# Patient Record
Sex: FEMALE | Race: WHITE | NOT HISPANIC OR LATINO | Employment: OTHER | URBAN - METROPOLITAN AREA
[De-identification: names, ages, dates, MRNs, and addresses within clinical notes are randomized per-mention and may not be internally consistent; named-entity substitution may affect disease eponyms.]

---

## 2017-05-10 ENCOUNTER — TELEPHONE (OUTPATIENT)
Dept: FAMILY MEDICINE | Facility: CLINIC | Age: 62
End: 2017-05-10

## 2017-05-10 NOTE — TELEPHONE ENCOUNTER
Panel Management Review      Patient has the following on her problem list: None      Composite cancer screening  Chart review shows that this patient is due/due soon for the following Pap Smear  Summary:    Patient is due/failing the following:   PAP    Action needed:   Patient needs office visit for pap only appointment, had physical in December 2016 but no pap was done. Does she get this done somewhere else?    Type of outreach:    Phone, spoke to patient.  she knows she is due and will call ob gyn to schedule    Questions for provider review:    None                                                                                                                                    Karlee Wylie CMA       Chart routed to Care Team .

## 2017-06-03 ENCOUNTER — HEALTH MAINTENANCE LETTER (OUTPATIENT)
Age: 62
End: 2017-06-03

## 2017-09-12 ENCOUNTER — OFFICE VISIT (OUTPATIENT)
Dept: VASCULAR SURGERY | Facility: CLINIC | Age: 62
End: 2017-09-12
Payer: COMMERCIAL

## 2017-09-12 ENCOUNTER — OFFICE VISIT (OUTPATIENT)
Dept: FAMILY MEDICINE | Facility: CLINIC | Age: 62
End: 2017-09-12
Payer: COMMERCIAL

## 2017-09-12 VITALS
DIASTOLIC BLOOD PRESSURE: 76 MMHG | SYSTOLIC BLOOD PRESSURE: 114 MMHG | RESPIRATION RATE: 12 BRPM | WEIGHT: 164 LBS | HEART RATE: 50 BPM | TEMPERATURE: 97.5 F | BODY MASS INDEX: 26.47 KG/M2 | OXYGEN SATURATION: 99 %

## 2017-09-12 DIAGNOSIS — Z11.59 ENCOUNTER FOR HEPATITIS C SCREENING TEST FOR LOW RISK PATIENT: ICD-10-CM

## 2017-09-12 DIAGNOSIS — Z53.9 ERRONEOUS ENCOUNTER--DISREGARD: Primary | ICD-10-CM

## 2017-09-12 DIAGNOSIS — Z23 NEED FOR PROPHYLACTIC VACCINATION AND INOCULATION AGAINST INFLUENZA: ICD-10-CM

## 2017-09-12 DIAGNOSIS — Z13.6 CARDIOVASCULAR SCREENING; LDL GOAL LESS THAN 160: ICD-10-CM

## 2017-09-12 DIAGNOSIS — S46.812A TRAPEZIUS MUSCLE STRAIN, LEFT, INITIAL ENCOUNTER: Primary | ICD-10-CM

## 2017-09-12 DIAGNOSIS — Z23 NEED FOR SHINGLES VACCINE: ICD-10-CM

## 2017-09-12 LAB
ALBUMIN SERPL-MCNC: 3.9 G/DL (ref 3.4–5)
ALP SERPL-CCNC: 58 U/L (ref 40–150)
ALT SERPL W P-5'-P-CCNC: 26 U/L (ref 0–50)
ANION GAP SERPL CALCULATED.3IONS-SCNC: 7 MMOL/L (ref 3–14)
AST SERPL W P-5'-P-CCNC: 20 U/L (ref 0–45)
BILIRUB SERPL-MCNC: 0.4 MG/DL (ref 0.2–1.3)
BUN SERPL-MCNC: 18 MG/DL (ref 7–30)
CALCIUM SERPL-MCNC: 9.4 MG/DL (ref 8.5–10.1)
CHLORIDE SERPL-SCNC: 105 MMOL/L (ref 94–109)
CHOLEST SERPL-MCNC: 187 MG/DL
CO2 SERPL-SCNC: 28 MMOL/L (ref 20–32)
CREAT SERPL-MCNC: 0.89 MG/DL (ref 0.52–1.04)
GFR SERPL CREATININE-BSD FRML MDRD: 64 ML/MIN/1.7M2
GLUCOSE SERPL-MCNC: 99 MG/DL (ref 70–99)
HDLC SERPL-MCNC: 76 MG/DL
LDLC SERPL CALC-MCNC: 101 MG/DL
NONHDLC SERPL-MCNC: 111 MG/DL
POTASSIUM SERPL-SCNC: 4.3 MMOL/L (ref 3.4–5.3)
PROT SERPL-MCNC: 7.1 G/DL (ref 6.8–8.8)
SODIUM SERPL-SCNC: 140 MMOL/L (ref 133–144)
TRIGL SERPL-MCNC: 48 MG/DL

## 2017-09-12 PROCEDURE — 99243 OFF/OP CNSLTJ NEW/EST LOW 30: CPT | Performed by: SURGERY

## 2017-09-12 PROCEDURE — 99213 OFFICE O/P EST LOW 20 MIN: CPT | Mod: 25 | Performed by: NURSE PRACTITIONER

## 2017-09-12 PROCEDURE — 90471 IMMUNIZATION ADMIN: CPT | Performed by: NURSE PRACTITIONER

## 2017-09-12 PROCEDURE — 90736 HZV VACCINE LIVE SUBQ: CPT | Performed by: NURSE PRACTITIONER

## 2017-09-12 PROCEDURE — 90686 IIV4 VACC NO PRSV 0.5 ML IM: CPT | Performed by: NURSE PRACTITIONER

## 2017-09-12 PROCEDURE — 36415 COLL VENOUS BLD VENIPUNCTURE: CPT | Performed by: NURSE PRACTITIONER

## 2017-09-12 PROCEDURE — 90472 IMMUNIZATION ADMIN EACH ADD: CPT | Performed by: NURSE PRACTITIONER

## 2017-09-12 PROCEDURE — 80061 LIPID PANEL: CPT | Performed by: NURSE PRACTITIONER

## 2017-09-12 PROCEDURE — 80053 COMPREHEN METABOLIC PANEL: CPT | Performed by: NURSE PRACTITIONER

## 2017-09-12 PROCEDURE — 86803 HEPATITIS C AB TEST: CPT | Performed by: NURSE PRACTITIONER

## 2017-09-12 NOTE — PROGRESS NOTES
[facility] Consultation    Airam Medrano is a 62-year-old female who presents with recurrent bilateral lower extremity varicose veins and leg pain.  She has had multiple procedures on both her lower extremities including apparent stripping and multiple procedures for phlebectomies.  She also has had ultrasound-guided sclerotherapy for recurrent varicose veins.  She was concerned that after her last session of sclerotherapy she developed hyperpigmentation on the posterior left calf.  She states that her last ultrasound was about one year ago at [facility].  She does not believe significant superficial venous insufficiency was found.    Airam has suffered complications of hemorrhage from veins on her right ankle.  She has worn support hose for years for 8-12 hours a day.    Should she feels that her symptoms are interfering with activities of daily living because of fatigue of her legs after she's been on her feet for any extended period of time.    She describes her pain as an aching, tiredness and heaviness as well as a cramping pain which is worse when she is standing for long periods of time, improves with elevation of the leg, ibuprofen and walking.  She sometimes gets relief from compression hose.  The pain is located primarily in her thighs and inner calves.    She has no history of deep vein thrombosis or superficial thrombophlebitis.  She does not have history of significant trauma to her legs.    She also complains of pain in the groins extending into her proximal medial thighs after intercourse.    Past medical history  Medical: Essentially negative  Surgical: Stab phlebectomy 1989, 2012 and 2015    Family history varicose veins in her mother.    Medicines: Multivitamins and aspirin    Allergies: NKDA    Social history: She is a nonsmoker and has maybe one drink of alcohol per month she is a mother of two children.    12 point review of systems was completed and is significant for  factor V Leiden mutation, 50 pound intentional weight loss, easy bruising, sore throat, back and neck pain, finger and toenail problems.    Physical exam  General: Pleasant female in no acute distress.  She is 5 feet 6 inches tall and weighs 160 pounds  HEENT: Normocephalic, atraumatic.  EOMI.  External ears and nose are normal.  Respiratory: Normal respiratory effort  Cardiovascular: Pulse is regular  Psychiatric: Judgment, insight, mood and affect are normal  Musculoskeletal: Gait and station are normal.  The joints of her fingers and toes appear normal.  There is no cyanosis  Neurologic: Grossly normal  Extremities: She has scattered  6-8 mm varicosities over her thighs, legs and extending onto her ankles.  She has a varicosity extending down the lateral right ankle onto the lateral aspect of her foot.  There is a 6 mm varicosity coursing from near the left groin crease onto the left medial thigh, down the left medial calf.  There is lipodermatosclerosis with hyperpigmentation around the distal medial left ankle.  She has mild lipodermatosclerosis of her right distal medial leg.  She has 1+ edema of both ankles.  There are numerous telangiectasias about her medial and lateral ankles bilaterally.     Impression  CEAP 4 chronic venous insufficiency bilateral lower extremities.  She states that she does not have superficial venous insufficiency a stone a recent ultrasound.  I will request and review the ultrasound to ensure this is the case.  We will then decide on a course of treatment for her.  This would likely involve a combination of medically necessary phlebectomies and ultrasound-guided sclerotherapy.  We also would need to treat the telangiectasias about her right ankle from which she has bled in the past.    JUAN Rivas M.D.

## 2017-09-12 NOTE — MR AVS SNAPSHOT
After Visit Summary   9/12/2017    Airam Medrano    MRN: [id number]           Patient Information     Date Of Birth          1955        Visit Information        Provider Department      9/12/2017 8:30 AM Haase, Susan Rachele, APRN [facility]        Today's Diagnoses     Trapezius muscle strain, left, initial encounter    -  1    CARDIOVASCULAR SCREENING; LDL GOAL LESS THAN 160           Follow-ups after your visit        Additional Services     [facility] PT, HAND, AND CHIROPRACTIC REFERRAL       **This order will print in the [facility] Scheduling Office**    Physical Therapy, Hand Therapy and Chiropractic Care are available through:    *[facility]  *[facility]  *[facility]    Call one number to schedule at any of the above locations: [phone].    Your provider has referred you to: Physical Therapy at [facility] or [facility]    Indication/Reason for Referral: trapezius muscle strain  Onset of Illness: 1 year ago  Therapy Orders: Evaluate and Treat  Special Programs: none  Special Request: none    Nelly Villegas      Additional Comments for the Therapist or Chiropractor:     Please be aware that coverage of these services is subject to the terms and limitations of your health insurance plan.  Call member services at your health plan with any benefit or coverage questions.      Please bring the following to your appointment:    *Your personal calendar for scheduling future appointments  *Comfortable clothing                  Follow-up notes from your care team     Return in about 3 months (around 12/12/2017) for Physical Exam.      Your next 10 appointments already scheduled     Sep 12, 2017  3:00 PM CDT   CONSULT with Jayme Rivas MD   [facility]    [address], [address]  [facility] [address] [phone]              Who to contact     If you have  "questions or need follow up information about today's clinic visit or your schedule please contact [facility] directly at [phone].  Normal or non-critical lab and imaging results will be communicated to you by MyChart, letter or phone within 4 business days after the clinic has received the results. If you do not hear from us within 7 days, please contact the clinic through [employer] or phone. If you have a critical or abnormal lab result, we will notify you by phone as soon as possible.  Submit refill requests through [employer] or call your pharmacy and they will forward the refill request to us. Please allow 3 business days for your refill to be completed.          Additional Information About Your Visit        [employer] Information     [employer] lets you send messages to your doctor, view your test results, renew your prescriptions, schedule appointments and more. To sign up, go to [email or web address] . Click on \"Log in\" on the left side of the screen, which will take you to the Welcome page. Then click on \"Sign up Now\" on the right side of the page.     You will be asked to enter the access code listed below, as well as some personal information. Please follow the directions to create your username and password.     Your access code is: [email or web address]  Expires: 2017  9:19 AM     Your access code will  in 90 days. If you need help or a new code, please call your [place] clinic or [phone].        Care EveryWhere ID     This is your Care EveryWhere ID. This could be used by other organizations to access your [place] medical records  [id number]        Your Vitals Were     Pulse Temperature Respirations Pulse Oximetry BMI (Body Mass Index)       50 97.5  F (36.4  C) (Oral) 12 99% 26.47 kg/m2        Blood Pressure from Last 3 Encounters:   17 114/76   16 120/60   16 120/72    Weight from Last 3 Encounters:   17 164 lb (74.4 kg)   16 210 lb (95.3 kg) "   01/06/16 185 lb (83.9 kg)              We Performed the Following     Comprehensive metabolic panel     [facility] PT, HAND, AND CHIROPRACTIC REFERRAL     Lipid panel reflex to direct LDL        Primary Care Provider Office Phone # Fax #    Susan Rachele Haase APRIVANA [phone] [address] [facility] [address]        Equal Access to Services     [facility] : Hadii aad ku hadasho Soomaali, waaxda luqadaha, qaybta kaalmada adeegyada, waxay idiin hayaan adeeg kharash la'aan ah. So [facility] [phone].    ATENCIÓN: Si habla espnalini, tiene a aguirre disposición servicios gratuitos de asistencia lingüística. Llame al [phone].    We comply with applicable federal civil rights laws and [place] laws. We do not discriminate on the basis of race, color, national origin, age, disability sex, sexual orientation or gender identity.            Thank you!     Thank you for choosing [facility]  for your care. Our goal is always to provide you with excellent care. Hearing back from our patients is one way we can continue to improve our services. Please take a few minutes to complete the written survey that you may receive in the mail after your visit with us. Thank you!             Your Updated Medication List - Protect others around you: Learn how to safely use, store and throw away your medicines at www.disposemymeds.org.          This list is accurate as of: 9/12/17  9:19 AM.  Always use your most recent med list.                   Brand Name Dispense Instructions for use Diagnosis    aspirin 81 MG tablet     100    ONE DAILY    Congenital deficiency of other clotting factors       MOTRIN 600 MG tablet   Generic drug:  ibuprofen      Take 600 mg by mouth every 6 hours as needed. 2164-6728 mg daily

## 2017-09-12 NOTE — NURSING NOTE
"Chief Complaint   Patient presents with     Musculoskeletal Problem       Initial /76 (BP Location: Right arm, Patient Position: Chair, Cuff Size: Adult Regular)  Pulse 50  Temp 97.5  F (36.4  C) (Oral)  Resp 12  Wt 164 lb (74.4 kg)  SpO2 99%  BMI 26.47 kg/m2 Estimated body mass index is 26.47 kg/(m^2) as calculated from the following:    Height as of 12/12/16: 5' 6\" (1.676 m).    Weight as of this encounter: 164 lb (74.4 kg).  Medication Reconciliation: complete   Karlee Wylie CMA      "

## 2017-09-12 NOTE — Clinical Note
Please abstract the following data from this visit with this patient into the appropriate field in Epic:  Pap smear done on this date: 12/1/2016 (approximately), by this group: [facility], results were normal.

## 2017-09-12 NOTE — MR AVS SNAPSHOT
After Visit Summary   9/12/2017    Airam Medrano    MRN: [id number]           Patient Information     Date Of Birth          1955        Visit Information        Provider Department      9/12/2017 3:00 PM Jayme Rivas MD [facility]      Today's Diagnoses     ERRONEOUS ENCOUNTER--DISREGARD    -  1       Follow-ups after your visit        Your next 10 appointments already scheduled     Apr 24, 2018 11:15 AM CDT   Ultrasound Guided Sclerotherapy with Jayme Rivas MD,  VEIN PROCEDURE ROOM 2   [facility]    [address]  [facility] [address]   [phone]            May 15, 2018  1:15 PM CDT   Assessment Injection with Possible Treatment with Jayme Rivas MD   [facility]    [address]  [facility] [id number] [phone]              Who to contact     If you have questions or need follow up information about today's clinic visit or your schedule please contact [facility] directly at [phone].  Normal or non-critical lab and imaging results will be communicated to you by MyChart, letter or phone within 4 business days after the clinic has received the results. If you do not hear from us within 7 days, please contact the clinic through MyChart or phone. If you have a critical or abnormal lab result, we will notify you by phone as soon as possible.  Submit refill requests through [employer] or call your pharmacy and they will forward the refill request to us. Please allow 3 business days for your refill to be completed.          Additional Information About Your Visit        [employer] Information     [employer] lets you send messages to your doctor, view your test results, renew your prescriptions, schedule appointments and  "more. To sign up, go to [email or web address] . Click on \"Log in\" on the left side of the screen, which will take you to the Welcome page. Then click on \"Sign up Now\" on the right side of the page.     You will be asked to enter the access code listed below, as well as some personal information. Please follow the directions to create your username and password.     Your access code is: [email or web address]  Expires: 2018  1:20 PM     Your access code will  in 90 days. If you need help or a new code, please call your [place] clinic or [phone].        Care EveryWhere ID     This is your Care EveryWhere ID. This could be used by other organizations to access your [place] medical records  [id number]         Blood Pressure from Last 3 Encounters:   17 114/76   16 120/60   16 120/72    Weight from Last 3 Encounters:   17 74.4 kg (164 lb)   16 95.3 kg (210 lb)   16 83.9 kg (185 lb)              Today, you had the following     No orders found for display       Primary Care Provider Office Phone # Fax #    Susan Rachele Haase, APRN [phone] [address] [facility] [address]        Equal Access to Services     [facility]: Hadii eyad bocanegrao Soprasad, waaxda luqadaha, qaybta kaalmada adejaironyada, leda ley . So [facility] [phone].    ATENCIÓN: Si habla español, tiene a aguirre disposición servicios gratuitos de asistencia lingüística. Llame al [phone].    We comply with applicable federal civil rights laws and [place] laws. We do not discriminate on the basis of race, color, national origin, age, disability, sex, sexual orientation, or gender identity.            Thank you!     Thank you for choosing [facility]  for your care. Our goal is always to provide you with excellent care. Hearing back from our patients is one way we can continue to improve our services. Please take a few minutes " to complete the written survey that you may receive in the mail after your visit with us. Thank you!             Your Updated Medication List - Protect others around you: Learn how to safely use, store and throw away your medicines at www.disposemymeds.org.          This list is accurate as of 9/12/17 11:59 PM.  Always use your most recent med list.                   Brand Name Dispense Instructions for use Diagnosis    aspirin 81 MG tablet     100    ONE DAILY    Congenital deficiency of other clotting factors       MOTRIN 600 MG tablet   Generic drug:  ibuprofen      Take 600 mg by mouth every 6 hours as needed. 2486-7680 mg daily

## 2017-09-12 NOTE — LETTER
September 14, 2017      Airam EDOUARD [place]  [address] [address] [facility] [address]         Hi Airam,     Your lab results are as below:   1)  Hepatitis C screening was negative.   2)  Cholesterol was normal at 187,  your LDL (bad cholesterol) and your HDL (good cholesterol) were also within normal range.   3)  Glucose was normal at 99 (normal fasting is <100).     Resulted Orders   Lipid panel reflex to direct LDL   Result Value Ref Range    Cholesterol 187 <200 mg/dL    Triglycerides 48 <150 mg/dL      Comment:      Fasting specimen    HDL Cholesterol 76 >49 mg/dL    LDL Cholesterol Calculated 101 (H) <100 mg/dL      Comment:      Above desirable:  100-129 mg/dl  Borderline High:  130-159 mg/dL  High:             160-189 mg/dL  Very high:       >189 mg/dl      Non HDL Cholesterol 111 <130 mg/dL   Comprehensive metabolic panel   Result Value Ref Range    Sodium 140 133 - 144 mmol/L    Potassium 4.3 3.4 - 5.3 mmol/L    Chloride 105 94 - 109 mmol/L    Carbon Dioxide 28 20 - 32 mmol/L    Anion Gap 7 3 - 14 mmol/L    Glucose 99 70 - 99 mg/dL      Comment:      Fasting specimen    Urea Nitrogen 18 7 - 30 mg/dL    Creatinine 0.89 0.52 - 1.04 mg/dL    GFR Estimate 64 >60 mL/min/1.7m2      Comment:      Non  GFR Calc    GFR Estimate If Black 78 >60 mL/min/1.7m2      Comment:       GFR Calc    Calcium 9.4 8.5 - 10.1 mg/dL    Bilirubin Total 0.4 0.2 - 1.3 mg/dL    Albumin 3.9 3.4 - 5.0 g/dL    Protein Total 7.1 6.8 - 8.8 g/dL    Alkaline Phosphatase 58 40 - 150 U/L    ALT 26 0 - 50 U/L    AST 20 0 - 45 U/L   Hepatitis C Screen Reflex to HCV RNA Quant and Genotype   Result Value Ref Range    Hepatitis C Antibody Nonreactive NR^Nonreactive      Comment:      Assay performance characteristics have not been established for newborns,   infants, and children       If you have any questions or concerns, please call the clinic at the number listed above.     Sincerely,        Susan Haase, APRN  CNP

## 2017-09-12 NOTE — PROGRESS NOTES
SUBJECTIVE:   Airam Medrano is a 62 year old female who presents to clinic today for the following health issues:    Neck Pain      Duration: 1 year    Description:  Location: L side  Radiation:into the left shoulder and into the left forearm    Intensity:  moderate    Accompanying signs and symptoms: occasional numbness in fingers    History (similar episodes/previous evaluation): None    Precipitating or alleviating factors: None    Therapies tried and outcome: changed pillow and purse    Airam has been getting neck pain in her left trapezius. She notices that wearing a seat belt and carrying her cross body bag worsens the pain, and for a while she was having numbness in her 3rd and 4th fingers of her left hand. Since stopping wearing her cross body bag the numbness in her fingers has resolved.   She has tried ice and heat with moderate relief, and denies a past neck injury. She does not want medication, and is instead looking for a referral to physical therapy or massage therapy.    She also noted that her father had torticollis.    OB/GYN: In the past, Airam has gotten her Pap from her OB/GYN. She wants to do it here from now on. She will come back in December to get that done.     Weight:  Has lost 40 lbs since 12/2017, would like her cholesterol checked again.     Problem list and histories reviewed & adjusted, as indicated.  Additional history: as documented    Reviewed and updated as needed this visit by clinical staffTobacco  Allergies  Med Hx  Surg Hx  Fam Hx  Soc Hx      Reviewed and updated as needed this visit by Provider       ROS:  Constitutional, HEENT, cardiovascular, pulmonary, GI, , musculoskeletal, neuro, skin, endocrine and psych systems are negative, except as otherwise noted.    This document serves as a record of the services and decisions personally performed and made by Susan Haase, CNP. It was created on her behalf by George Kiran, a trained medical scribe. The creation of this  document is based the provider's statements to the medical scribe.  George Kiran September 12, 2017 9:23 AM      OBJECTIVE:   /76 (BP Location: Right arm, Patient Position: Chair, Cuff Size: Adult Regular)  Pulse 50  Temp 97.5  F (36.4  C) (Oral)  Resp 12  Wt 74.4 kg (164 lb)  SpO2 99%  BMI 26.47 kg/m2  Body mass index is 26.47 kg/(m^2).  GENERAL: healthy, alert and no distress  NECK: no adenopathy, no asymmetry, masses, or scars and thyroid normal to palpation  RESP: lungs clear to auscultation - no rales, rhonchi or wheezes  CV: regular rate and rhythm, normal S1 S2, no S3 or S4, no murmur, click or rub, no peripheral edema  MS: left posterior trapezius muscle tender to palpation, increased pain with lateral rotation of her head to the left, full flexion, hyperextension.   muscle strength normal  NEURO: BUE with normal strength, tone and reflexes.  CMS intact.   PSYCH: mentation appears normal, affect normal/bright      ASSESSMENT/PLAN:     Airam was seen today for musculoskeletal problem.    Diagnoses and all orders for this visit:    Trapezius muscle strain, left, initial encounter:  Instructed to apply heat or ice for 15-20 min tid, may take ibuprofen or tylenol on prn basis for more severe pain.    -     [facility] PT, HAND, AND CHIROPRACTIC REFERRAL    CARDIOVASCULAR SCREENING; LDL GOAL LESS THAN 160  -     Lipid panel reflex to direct LDL  -     Comprehensive metabolic panel    Need for prophylactic vaccination and inoculation against influenza  -     FLU VAC, SPLIT VIRUS IM > 3 YO (QUADRIVALENT) [47799]  -     Vaccine Administration, Initial [81295]    Need for shingles vaccine  -     ZOSTER VACC LIVE SUBQ NJX    Follow up in 12/2017 for physical exam, sooner as needed.   The information in this document, created by the medical scribe for me, accurately reflects the services I personally performed and the decisions made by me. I have reviewed and approved this document for accuracy.   Susan Haase, CNP      Susan Haase, APRN [facility]  Injectable Influenza Immunization Documentation    1.  Are you sick today? (Fever of 100.5 or higher on the day of the clinic)   No    2.  Have you ever had Guillain-Chelsea Syndrome within 6 weeks of an influenza vaccination?  No    3. Do you have a life-threatening allergy to eggs?  No    4. Do you have a life-threatening allergy to a component of the vaccine? May include antibiotics, gelatin or latex.  No     5. Have you ever had a reaction to a dose of flu vaccine that needed immediate medical attention?  No     Form completed by Lou

## 2017-09-13 LAB — HCV AB SERPL QL IA: NONREACTIVE

## 2017-09-19 ENCOUNTER — THERAPY VISIT (OUTPATIENT)
Dept: PHYSICAL THERAPY | Facility: CLINIC | Age: 62
End: 2017-09-19
Payer: COMMERCIAL

## 2017-09-19 DIAGNOSIS — M54.2 NECK PAIN: Primary | ICD-10-CM

## 2017-09-19 PROCEDURE — 97110 THERAPEUTIC EXERCISES: CPT | Mod: GP | Performed by: PHYSICAL THERAPIST

## 2017-09-19 PROCEDURE — 97161 PT EVAL LOW COMPLEX 20 MIN: CPT | Mod: GP | Performed by: PHYSICAL THERAPIST

## 2017-09-19 NOTE — MR AVS SNAPSHOT
"              After Visit Summary   9/19/2017    Airam Medrano    MRN: [id number]           Patient Information     Date Of Birth          1955        Visit Information        Provider Department      9/19/2017 10:50 AM Ye Morataya, PT [facility]        Today's Diagnoses     Neck pain    -  1       Follow-ups after your visit        Your next 10 appointments already scheduled     Oct 03, 2017  9:40 AM CDT   JANNETTE Spine with Ye Morataya PT   [facility] ([facility])    [address]  [facility] [id number] [phone]            Oct 17, 2017  9:40 AM CDT   JANNETTE Spine with Ye Morataya PT   [facility] ([facility])    [address]  [facility] [id number] [phone]              Who to contact     If you have questions or need follow up information about today's clinic visit or your schedule please contact [facility] directly at [phone].  Normal or non-critical lab and imaging results will be communicated to you by [employer]hart, letter or phone within 4 business days after the clinic has received the results. If you do not hear from us within 7 days, please contact the clinic through [employer] or phone. If you have a critical or abnormal lab result, we will notify you by phone as soon as possible.  Submit refill requests through [employer] or call your pharmacy and they will forward the refill request to us. Please allow 3 business days for your refill to be completed.          Additional Information About Your Visit        [employer] Information     [employer] lets you send messages to your doctor, view your test results, renew your prescriptions, schedule appointments and more. To sign up, go to [email or web address] . Click on \"Log in\" on the left side " "of the screen, which will take you to the Welcome page. Then click on \"Sign up Now\" on the right side of the page.     You will be asked to enter the access code listed below, as well as some personal information. Please follow the directions to create your username and password.     Your access code is: [email or web address]  Expires: 2017  9:19 AM     Your access code will  in 90 days. If you need help or a new code, please call your [facility] clinic or [phone].        Care EveryWhere ID     This is your Care EveryWhere ID. This could be used by other organizations to access your [facility] medical records  [id number]         Blood Pressure from Last 3 Encounters:   17 114/76   16 120/60   16 120/72    Weight from Last 3 Encounters:   17 74.4 kg (164 lb)   16 95.3 kg (210 lb)   16 83.9 kg (185 lb)              We Performed the Following     HC PT EVAL, LOW COMPLEXITY     JANNETTE INITIAL EVAL REPORT     THERAPEUTIC EXERCISES        Primary Care Provider Office Phone # Fax #    Nina Worley Haase, APRN [phone] [address] [facility] [address]        Equal Access to Services     SHANNAN TONG : Hadii aad ku hadasho Soomaali, waaxda luqadaha, qaybta kaalmada adeegyada, waxay idiin hayaan jon ley . So [facility] [phone].    ATENCIÓN: Si habla español, tiene a aguirre disposición servicios gratuitos de asistencia lingüística. Llame al [phone].    We comply with applicable federal civil rights laws and [place] laws. We do not discriminate on the basis of race, color, national origin, age, disability sex, sexual orientation or gender identity.            Thank you!     Thank you for choosing [facility]  for your care. Our goal is always to provide you with excellent care. Hearing back from our patients is one way we can continue to improve our services. Please take a few minutes to " complete the written survey that you may receive in the mail after your visit with us. Thank you!             Your Updated Medication List - Protect others around you: Learn how to safely use, store and throw away your medicines at www.disposemymeds.org.          This list is accurate as of: 9/19/17 11:53 AM.  Always use your most recent med list.                   Brand Name Dispense Instructions for use Diagnosis    aspirin 81 MG tablet     100    ONE DAILY    Congenital deficiency of other clotting factors       MOTRIN 600 MG tablet   Generic drug:  ibuprofen      Take 600 mg by mouth every 6 hours as needed. 5409-8956 mg daily

## 2017-09-19 NOTE — PROGRESS NOTES
Subjective:    Patient is a 62 year old female presenting with rehab cervical spine hpi. The history is provided by the patient. No  was used.   Airam Medrano is a 62 year old female with a cervical spine condition.  Condition occurred with:  Insidious onset.  Condition occurred: for unknown reasons.  This is a chronic condition  Pt reports having left neck pain that has been present for greater than one year.  Onset of sx's occurred when she would war a shoulder purse for extended periods of time and wearing a seat belt.  MD appt 9/14/17..    Patient reports pain:  Cervical left side.  Radiates to:  None.  Pain is described as aching and is intermittent and reported as 2/10.  Associated symptoms:  Loss of motion/stiffness. Pain is worse during the day.  Symptoms are exacerbated by rotating head, looking up or down and driving and relieved by rest.  Since onset symptoms are gradually improving.        General health as reported by patient is excellent.  Pertinent medical history includes:  Overweight.  Medical allergies: no.    Current medications:  None as reported by the patient.  Current occupation is Retired [email or web address]    .        Barriers include:  None as reported by the patient.    Red flags:  None as reported by the patient.                        Objective:    Standing Alignment:    Cervical/Thoracic:  Forward head  Shoulder/UE:  Rounded shoulders                  Flexibility/Screens:   Positive screens:  Cervical                        Cervical/Thoracic Evaluation    AROM:  AROM Cervical:    Flexion:            100% with ERT  Extension:       100% with ERT  Rotation:         Left: 75% with ERT     Right: 75% with ERT  Side Bend:      Left: 75% with ERT     Right:  75% with ERT      Headaches: none                                                            General     ROS    Assessment/Plan:      Patient is a 62 year old female with cervical complaints.    Patient has the following  significant findings with corresponding treatment plan.                Diagnosis 1:  Neck pain  Pain -  self management, education, directional preference exercise and home program  Decreased ROM/flexibility - manual therapy and therapeutic exercise  Decreased strength - therapeutic exercise and therapeutic activities  Impaired muscle performance - neuro re-education  Decreased function - therapeutic activities    Therapy Evaluation Codes:   1) History comprised of:   Personal factors that impact the plan of care:      None.    Comorbidity factors that impact the plan of care are:      Overweight.     Medications impacting care: None.  2) Examination of Body Systems comprised of:   Body structures and functions that impact the plan of care:      Cervical spine.   Activity limitations that impact the plan of care are:      Driving and Sitting.  3) Clinical presentation characteristics are:   Stable/Uncomplicated.  4) Decision-Making    Low complexity using standardized patient assessment instrument and/or measureable assessment of functional outcome.  Cumulative Therapy Evaluation is: Low complexity.    Previous and current functional limitations:  (See Goal Flow Sheet for this information)    Short term and Long term goals: (See Goal Flow Sheet for this information)     Communication ability:  Patient appears to be able to clearly communicate and understand verbal and written communication and follow directions correctly.  Treatment Explanation - The following has been discussed with the patient:   RX ordered/plan of care  Anticipated outcomes  Possible risks and side effects  This patient would benefit from PT intervention to resume normal activities.   Rehab potential is good.    Frequency:  1 X week, once daily  Duration:  for 6 weeks  Discharge Plan:  Achieve all LTG.  Independent in home treatment program.  Reach maximal therapeutic benefit.    Please refer to the daily flowsheet for treatment today, total  treatment time and time spent performing 1:1 timed codes.

## 2017-10-16 PROBLEM — M54.2 NECK PAIN: Status: RESOLVED | Noted: 2017-09-19 | Resolved: 2017-10-16

## 2017-11-01 ENCOUNTER — OFFICE VISIT (OUTPATIENT)
Dept: VASCULAR SURGERY | Facility: CLINIC | Age: 62
End: 2017-11-01
Payer: COMMERCIAL

## 2017-11-01 ENCOUNTER — APPOINTMENT (OUTPATIENT)
Dept: VASCULAR SURGERY | Facility: CLINIC | Age: 62
End: 2017-11-01
Payer: COMMERCIAL

## 2017-11-01 DIAGNOSIS — Z53.9 ERRONEOUS ENCOUNTER--DISREGARD: Primary | ICD-10-CM

## 2017-11-01 PROCEDURE — 93970 EXTREMITY STUDY: CPT | Performed by: SURGERY

## 2017-11-01 PROCEDURE — 99213 OFFICE O/P EST LOW 20 MIN: CPT | Performed by: SURGERY

## 2017-11-01 NOTE — MR AVS SNAPSHOT
After Visit Summary   11/1/2017    Airam Medrano    MRN: [id number]           Patient Information     Date Of Birth          1955        Visit Information        Provider Department      11/1/2017 10:30 AM Jayme Rivas MD [facility]      Today's Diagnoses     ERRONEOUS ENCOUNTER--DISREGARD    -  1       Follow-ups after your visit        Your next 10 appointments already scheduled     Apr 24, 2018 11:15 AM CDT   Ultrasound Guided Sclerotherapy with Jayme Rivas MD,  VEIN PROCEDURE ROOM 2   [facility] ([facility])    [address]  [facility] [address]   [phone]            May 15, 2018  1:15 PM CDT   Assessment Injection with Possible Treatment with Jayme Rivas MD   [facility] ([facility])    [address]  [facility] [id number] [phone]              Who to contact     If you have questions or need follow up information about today's clinic visit or your schedule please contact [facility] directly at [phone].  Normal or non-critical lab and imaging results will be communicated to you by MyChart, letter or phone within 4 business days after the clinic has received the results. If you do not hear from us within 7 days, please contact the clinic through MyChart or phone. If you have a critical or abnormal lab result, we will notify you by phone as soon as possible.  Submit refill requests through [employer] or call your pharmacy and they will forward the refill request to us. Please allow 3 business days for your refill to be completed.          Additional Information About Your Visit        [employer] Information     [employer] lets you send messages to your doctor, view your test results, renew your prescriptions, schedule appointments and  "more. To sign up, go to [email or web address] . Click on \"Log in\" on the left side of the screen, which will take you to the Welcome page. Then click on \"Sign up Now\" on the right side of the page.     You will be asked to enter the access code listed below, as well as some personal information. Please follow the directions to create your username and password.     Your access code is: [email or web address]  Expires: 2018  1:20 PM     Your access code will  in 90 days. If you need help or a new code, please call your [facility] clinic or [phone].        Care EveryWhere ID     This is your Care EveryWhere ID. This could be used by other organizations to access your [facility] medical records  [id number]         Blood Pressure from Last 3 Encounters:   17 114/76   16 120/60   16 120/72    Weight from Last 3 Encounters:   17 74.4 kg (164 lb)   16 95.3 kg (210 lb)   16 83.9 kg (185 lb)              Today, you had the following     No orders found for display       Primary Care Provider Office Phone # Fax #    Susan Rachele Haase, APRN [phone] [address] [facility] [address]        Equal Access to Services     [facility]: Hadii eyad bocanegrao Soprasad, waaxda luqadaha, qaybta kaalmada adejaironyada, leda ley . So [facility] [phone].    ATENCIÓN: Si habla español, tiene a aguirre disposición servicios gratuitos de asistencia lingüística. Llame al [phone].    We comply with applicable federal civil rights laws and [place] laws. We do not discriminate on the basis of race, color, national origin, age, disability, sex, sexual orientation, or gender identity.            Thank you!     Thank you for choosing [facility]  for your care. Our goal is always to provide you with excellent care. Hearing back from our patients is one way we can continue to improve our services. Please take a few minutes " to complete the written survey that you may receive in the mail after your visit with us. Thank you!             Your Updated Medication List - Protect others around you: Learn how to safely use, store and throw away your medicines at www.disposemymeds.org.          This list is accurate as of 11/1/17 11:59 PM.  Always use your most recent med list.                   Brand Name Dispense Instructions for use Diagnosis    aspirin 81 MG tablet     100    ONE DAILY    Congenital deficiency of other clotting factors       MOTRIN 600 MG tablet   Generic drug:  ibuprofen      Take 600 mg by mouth every 6 hours as needed. 2461-0780 mg daily

## 2017-11-02 NOTE — PROGRESS NOTES
[facility] Progress Note    Airam Medrano returns in follow-up of bilateral lower family pain, recurrent varicose veins and swelling.  Please see my dictation of 9/12/17 for details.  She returned today for duplex ultrasound of her lower extremity veins bilaterally.    Duplex ultrasound of her right lower extremity veins reveals no evidence of deep vein thrombosis.  Her right common femoral vein is incompetent but the remaining deep vein bowels are competent.  Her right great saphenous vein is surgically absent with only a short segment remaining at the level of the knee which is tortuous and which supplies a 4.4 mm diameter incompetent vein branch.  The right small saphenous vein is competent.  The anterior accessory saphenous vein is not visualized and the right vein of Giacomini is competent.  There is an incompetent vein branch coursing down the anteromedial right thigh measuring 4.1 mm in diameter.  There is a 4 mm diameter incompetent  on the medial aspect of the right calf 17 cm above the right medial malleolus which Indicates with the 4.4 mm diameter incompetent vein branch.    In the left lower extremity was no evidence of deep vein thrombosis.  The left lower extremity deep veins are incompetent from the common femoral through the popliteal veins.  The left great saphenous vein is for the most part absent.  There is a short segment in the thigh which is competent and a short segment at the mid calf which is incompetent.  The left small saphenous vein is competent as is the vein of Giacomini.  The left anterior accessory saphenous vein is not visualized.  There is a 5 mm diameter pelvic source varicose vein coursing from the posterior medial aspect of the thigh coursing down the medial thigh and onto the medial aspect of the right leg where it measures 4.8 mm in diameter.    Exam reveals 2+ edema of her ankles bilaterally with lipomata metal sclerosis but sees firmness and hyperpigmentation  cortices of both of her distal medial legs.  There are sizable varicosities coursing from her thighs down to her legs bilaterally.    Impression  CEAP 4 bilateral lower extremity chronic venous insufficiency.  She has been wearing compression hose for years but continues to progress despite this along with exercise, leg elevation, dietary measures and attempted weight loss.  We discussed options of continued conservative management with the aforementioned conservative measures with risks of progression of her disease process.    If she chose treatment, she would be a candidate for endovenous ablation of the incompetent  on her right leg with phlebectomies of the visible varicose veins.  She would require ultrasound-guided sclerotherapy of the pelvic source varicose veins on her left leg with phlebectomies of visible varicose veins.    She will likely require ultrasound-guided sclerotherapy of recurrent varicose veins on her right thigh and leg if she does not have adequate relief of her symptoms with simple phlebectomy and  ablation.  This was discussed frankly with her.    JUAN Rivas M.D.

## 2017-12-12 ENCOUNTER — OFFICE VISIT (OUTPATIENT)
Dept: VASCULAR SURGERY | Facility: CLINIC | Age: 62
End: 2017-12-12
Payer: COMMERCIAL

## 2017-12-12 DIAGNOSIS — Z53.9 ERRONEOUS ENCOUNTER--DISREGARD: Primary | ICD-10-CM

## 2017-12-12 PROCEDURE — 37766 PHLEB VEINS - EXTREM 20+: CPT | Mod: RT | Performed by: SURGERY

## 2017-12-12 PROCEDURE — 36475 ENDOVENOUS RF 1ST VEIN: CPT | Mod: RT | Performed by: SURGERY

## 2017-12-12 NOTE — PROGRESS NOTES
[facility] Operative Report    Preoperative diagnosis  1.  Bilateral CEAP 4 chronic venous insufficiency  2.  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  Radiofrequency ablation incompetent, symptomatically perforating vein right lower extremity (12 cm from right medial malleolus)  2.  Multiple medically necessary step phlebectomies bilateral lower extremities (22 stabs right, 23 stabs left)    Surgeon  JUAN Rivas M.D.    First assistant  Serena Harris CST/CSFA    Anesthesia  Ativan 3 mg, clonidine 0.1 mg orally with subcutaneous tumescent    Operative description  Details the procedure including risks of bleeding, infection, nerve injury, scarring, hyperpigmentation, the vein thrombosis, recanalization of the close veins and recurrent varicose veins had been discussed.  The patient appeared to understand and wished to proceed.  Informed consent was obtained.    I had the patient stand and marked varicosities coursing from the proximal thighs to the distal legs bilaterally.  We then proceeded to the operating room, had the patient lie supine on the operative table, then prepped and draped both groins and both lower extremities sterilely from the groins to the toes.    We took a timeout to confirm the appropriate operative site and procedure: Radiofrequency ablation of an incompetent perforating vein right medial leg with bilateral, medically necessary phlebectomies.    With the operative table in reverse Trendelenburg position, I interrogated the right leg with ultrasound was able to clearly identify the incompetent perforating vein on the medial aspect of the right leg.  This  actually lay about 12 cm cephalad of the right medial malleolus.  I searched carefully for other incompetent perforators but only found a very small  approximately 18  cm cephalad of the medial malleolus.    I infiltrated the skin overlying the incompetent  with 1% lidocaine, made a stab wound with an 11 scalpel and then accessed the vein with the RFS closure device obtaining blood return and impedance readings of 289 ohms.  1% lidocaine was infiltrated around the  on GUIDANCE AND THE BLOCK ALLOWED TO TAKE EFFECT.  WE PLACED SEPARATE TABLE IN TRENDELENBURG POSITION, THEN TREATED THE  FOR FOUR MINUTES, ONE MINUTE IN THE 12, SIX, THREE AND 9:00 QUADRANTS AT THE LEVEL OF THE FASCIA.  I THEN WITHDREW THE DEVICE APPROXIMATELY 8 MM AND TREATED FOR ANOTHER FOUR MINUTES IN THE VEIN JUST SUPERFICIAL TO THE FASCIA FOR FOUR MINUTES, ONE MINUTE AND 12, SIX, THREE AND 9:00 QUADRANTS.  I REMOVED THE STYLETTE AND REIMAGED THE VEIN FINDING IT TO BE WITHOUT FLOW.    WE MADE STAB WAS DECIDED TO THE MARKED VARICOSITIES, RETRIEVEd THEM WITH EITHER VEIN hooks or JOSE HOOKS AND AVULSED THEM WITH MOSQUITO CLAMPS.  Hemostasis was secured with pressure.    After we completed the phlebectomies, with clean legs with saline solution and placed petroleum jelly on the skin.  We dressed the leg with ABD pads, cast padding and an Ace bandage from the toes to groin.  We then observed for 30 minutes to ensure excellent hemostasis.    Postoperative instructions were covered in written and verbal form with her  and then she was taken to her vehicle in a wheelchair.  She tolerated the procedure well without complications.    We used a total of 272 mL of tumescent anesthetic to perform a 22 phlebectomies on the right leg, 23 phlebectomy on the left leg.  We used a total of seven mL of 1% lidocaine.    JUAN Rivas M.D.

## 2017-12-12 NOTE — MR AVS SNAPSHOT
After Visit Summary   12/12/2017    Airam Medrano    MRN: [id number]           Patient Information     Date Of Birth          1955        Visit Information        Provider Department      12/12/2017 8:30 AM Jayme Rivas MD;  VEIN PROCEDURE ROOM 1 [facility]      Today's Diagnoses     ERRONEOUS ENCOUNTER--DISREGARD    -  1       Follow-ups after your visit        Your next 10 appointments already scheduled     Apr 24, 2018 11:15 AM CDT   Ultrasound Guided Sclerotherapy with Jayme Rivas MD,  VEIN PROCEDURE ROOM 2   [facility] ([facility])    [address]  [facility] [address] [phone]            May 15, 2018  1:15 PM CDT   Assessment Injection with Possible Treatment with Jayme Rivas MD   [facility] ([facility]    [address]  [facility] [address] [phone]              Who to contact     If you have questions or need follow up information about today's clinic visit or your schedule please contact [facility] directly at [phone].  Normal or non-critical lab and imaging results will be communicated to you by MyChart, letter or phone within 4 business days after the clinic has received the results. If you do not hear from us within 7 days, please contact the clinic through MyChart or phone. If you have a critical or abnormal lab result, we will notify you by phone as soon as possible.  Submit refill requests through Metac[employer] or call your pharmacy and they will forward the refill request to us. Please allow 3 business days for your refill to be completed.          Additional Information About Your Visit        Labelby.mehart Information     Met[employer] lets you send messages to your doctor, view your test results, renew your prescriptions,  "schedule appointments and more. To sign up, go to [email or web address] . Click on \"Log in\" on the left side of the screen, which will take you to the Welcome page. Then click on \"Sign up Now\" on the right side of the page.     You will be asked to enter the access code listed below, as well as some personal information. Please follow the directions to create your username and password.     Your access code is: [email or web address]  Expires: 2018  1:20 PM     Your access code will  in 90 days. If you need help or a new code, please call your [place] clinic or [phone].        Care EveryWhere ID     This is your Care EveryWhere ID. This could be used by other organizations to access your [place] medical records  [id number]         Blood Pressure from Last 3 Encounters:   17 114/76   16 120/60   16 120/72    Weight from Last 3 Encounters:   17 74.4 kg (164 lb)   16 95.3 kg (210 lb)   16 83.9 kg (185 lb)              Today, you had the following     No orders found for display       Primary Care Provider Office Phone # Fax #    Nina Rachele Haase, APRN [phone] [address] [facility] [address]        Equal Access to Services     [facility]: Hadii aad ku hadasho Sojamarcusali, waaxda luqadaha, qaybta kaalmada adeegyada, leda ley . So [facility] [phone].    ATENCIÓN: Si habla español, tiene a aguirre disposición servicios gratuitos de asistencia lingüística. Llame al [phone].    We comply with applicable federal civil rights laws and [place] laws. We do not discriminate on the basis of race, color, national origin, age, disability, sex, sexual orientation, or gender identity.            Thank you!     Thank you for choosing [facility]  for your care. Our goal is always to provide you with excellent care. Hearing back from our patients is one way we can continue to improve our services. " Please take a few minutes to complete the written survey that you may receive in the mail after your visit with us. Thank you!             Your Updated Medication List - Protect others around you: Learn how to safely use, store and throw away your medicines at www.disposemymeds.org.          This list is accurate as of 12/12/17 11:59 PM.  Always use your most recent med list.                   Brand Name Dispense Instructions for use Diagnosis    aspirin 81 MG tablet     100    ONE DAILY    Congenital deficiency of other clotting factors       MOTRIN 600 MG tablet   Generic drug:  ibuprofen      Take 600 mg by mouth every 6 hours as needed. 2747-5012 mg daily

## 2017-12-15 ENCOUNTER — APPOINTMENT (OUTPATIENT)
Dept: VASCULAR SURGERY | Facility: CLINIC | Age: 62
End: 2017-12-15
Payer: COMMERCIAL

## 2017-12-15 PROCEDURE — 99207 ZZC VEINSOLUTIONS 48 HOUR: CPT | Performed by: SURGERY

## 2017-12-15 PROCEDURE — 93971 EXTREMITY STUDY: CPT | Performed by: SURGERY

## 2018-01-23 ENCOUNTER — OFFICE VISIT (OUTPATIENT)
Dept: VASCULAR SURGERY | Facility: CLINIC | Age: 63
End: 2018-01-23
Payer: COMMERCIAL

## 2018-01-23 ENCOUNTER — APPOINTMENT (OUTPATIENT)
Dept: VASCULAR SURGERY | Facility: CLINIC | Age: 63
End: 2018-01-23
Payer: COMMERCIAL

## 2018-01-23 DIAGNOSIS — Z53.9 ERRONEOUS ENCOUNTER--DISREGARD: Primary | ICD-10-CM

## 2018-01-23 PROCEDURE — 99207 ZZC VEINSOLUTIONS POST OPERATIVE VISIT: CPT | Performed by: SURGERY

## 2018-01-23 PROCEDURE — 36471 NJX SCLRSNT MLT INCMPTNT VN: CPT | Mod: 50 | Performed by: SURGERY

## 2018-01-23 PROCEDURE — 76942 ECHO GUIDE FOR BIOPSY: CPT | Performed by: SURGERY

## 2018-01-23 NOTE — MR AVS SNAPSHOT
After Visit Summary   1/23/2018    Airam Medrano    MRN: [id number]           Patient Information     Date Of Birth          1955        Visit Information        Provider Department      1/23/2018 1:15 PM Jayme Rivas MD [facility]      Today's Diagnoses     ERRONEOUS ENCOUNTER--DISREGARD    -  1       Follow-ups after your visit        Your next 10 appointments already scheduled     Apr 24, 2018 11:15 AM CDT   Ultrasound Guided Sclerotherapy with Jayme Rivas MD,  VEIN PROCEDURE ROOM 2   [facility]    [address]  [facility] [address]   [phone]            May 15, 2018  1:15 PM CDT   Assessment Injection with Possible Treatment with Jayme Rivas MD   [facility]    [address]  [facility] [address] [phone]              Who to contact     If you have questions or need follow up information about today's clinic visit or your schedule please contact [facility] directly at [phone].  Normal or non-critical lab and imaging results will be communicated to you by MyChart, letter or phone within 4 business days after the clinic has received the results. If you do not hear from us within 7 days, please contact the clinic through MyChart or phone. If you have a critical or abnormal lab result, we will notify you by phone as soon as possible.  Submit refill requests through [employer] or call your pharmacy and they will forward the refill request to us. Please allow 3 business days for your refill to be completed.          Additional Information About Your Visit        [employer] Information     [employer] lets you send messages to your doctor, view your test results, renew your prescriptions, schedule appointments and  "more. To sign up, go to [email or web address] . Click on \"Log in\" on the left side of the screen, which will take you to the Welcome page. Then click on \"Sign up Now\" on the right side of the page.     You will be asked to enter the access code listed below, as well as some personal information. Please follow the directions to create your username and password.     Your access code is: [email or web address]  Expires: 2018  1:20 PM     Your access code will  in 90 days. If you need help or a new code, please call your [place] clinic or [phone].        Care EveryWhere ID     This is your Care EveryWhere ID. This could be used by other organizations to access your [place] medical records  [id number]         Blood Pressure from Last 3 Encounters:   17 114/76   16 120/60   16 120/72    Weight from Last 3 Encounters:   17 74.4 kg (164 lb)   16 95.3 kg (210 lb)   16 83.9 kg (185 lb)              Today, you had the following     No orders found for display       Primary Care Provider Office Phone # Fax #    Susan Rachele Haase, APRN [phone] [address] [facility] [address]        Equal Access to Services     [facility]: Hadii eyad bocanegrao Soprasad, waaxda luqadaha, qaybta kaalmada adejaironyada, leda ley . So [facility] [phone].    ATENCIÓN: Si habla español, tiene a aguirre disposición servicios gratuitos de asistencia lingüística. Llame al [phone].    We comply with applicable federal civil rights laws and [place] laws. We do not discriminate on the basis of race, color, national origin, age, disability, sex, sexual orientation, or gender identity.            Thank you!     Thank you for choosing [facility]  for your care. Our goal is always to provide you with excellent care. Hearing back from our patients is one way we can continue to improve our services. Please take a few minutes " to complete the written survey that you may receive in the mail after your visit with us. Thank you!             Your Updated Medication List - Protect others around you: Learn how to safely use, store and throw away your medicines at www.disposemymeds.org.          This list is accurate as of 1/23/18 11:59 PM.  Always use your most recent med list.                   Brand Name Dispense Instructions for use Diagnosis    aspirin 81 MG tablet     100    ONE DAILY    Congenital deficiency of other clotting factors       MOTRIN 600 MG tablet   Generic drug:  ibuprofen      Take 600 mg by mouth every 6 hours as needed. 7285-7213 mg daily

## 2018-01-23 NOTE — PROGRESS NOTES
[facility] Procedure Report    Preprocedure diagnosis  1. Bilateral leg pain and varicose veins; status post endovenous ablation symptomatic perforating vein right lower extremity  2.  Symptomatic left lower extremity pelvic source varicose veins  3.  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  Ultrasound-guided sclerotherapy residual, incompetent right great saphenous vein  2.  Ultrasound-guided sclerotherapy multiple (4) incompetent, symptomatic non-saphenous tributary right medial thigh   3.  Ultrasound-guided sclerotherapy multiple (3) pelvic source veins leftthigh and left medial calf    Surgeon  JUAN Rivas MD    Procedure description  Airam Medrano presented with recurrent bilateral lower extremity varicose veins and leg pain.  We ablated a large  on her right leg and performed phlebectomies on 12/12/2017.  She returned today for treatment of saphenous and non-saphenous tributaries of her right leg as well as an incompetent segment of her right great saphenous vein near the knee and ultrasound-guided sclerotherapy of pelvic source varicose veins of her left lower extremity.  Details of procedure including risks of allergic reaction, deep vein thrombosis, ulceration, hyperpigmentation and the need for several sessions of sclerotherapy were discussed.  She appeared to understand and wish to proceed.  Informed consent was obtained.    I had Airam lie supine on our examining table and interrogated her right calf with ultrasound.  It was clear that there was a large varicosity coursing fromA remnant of the right great saphenous vein near the level of the knee.  The varicosity coursed both cephalad and caudally.  I chose to inject the vein at the level of the proximal calf with 1% polidocanol foam foamed in a 1 part polidocanol to 2.5 parts air configuration.  I injected 1.5 mL of foam filling the  vein nicely and sending it into tight spasm.  I then followed the vein more distally onto the posterior medial calf and injected an additional 1 mL of foam into the vein at this level.  I then traced the cluster varicosities to the right distal medial thigh and injected 0.5 mL of foam into the vein at this level.  I then imaged the vein in the proximal right thigh medially and injected an additional 1 mL of foam into the vein at this level filling it nicely.  I traced this tributary more posterior role medially, still in the proximal third of the thigh, and injected 0.5 mL of foam at this level.    I had the patient perform ankle pumps for approximately 2 minutes to ensure that there was no foaming or deep system.  I then interrogated her left leg with ultrasound was able to clearly identify the large pelvic source varicose veins coursing from near the left groin all the way down to the distal medial left calf.  I accessed the vein in the proximal third of the thigh initially under ultrasound guidance injecting 1 mL of foam, reaccessed this vein in the distal third of the thigh and injected 1.5 mL of foam and finally, accessed the vein at the mid calf level and injected an additional 1.5 mL of foam.  I was able to massage the foam to fill nearly the entire vein along the thigh and calf.  I once again had Airam perform ankle pumps with her left leg.    We cleaned the legs with saline solution and then applied thigh-high compression hose.  We had her walk for 10 minutes prior to getting her car to drive home.  We have encouraged her to walk when she gets home and to remain active today.  She will wear compression hose for 5 days to 7 days, sleeping in them tonight only.    She will return in 4 weeks in follow-up.  We used a total of 12 mL of foam.  She tolerated procedure well without evidence of complications.    JUAN Rivas MD

## 2018-02-20 ENCOUNTER — OFFICE VISIT (OUTPATIENT)
Dept: VASCULAR SURGERY | Facility: CLINIC | Age: 63
End: 2018-02-20
Payer: COMMERCIAL

## 2018-02-20 DIAGNOSIS — Z53.9 ERRONEOUS ENCOUNTER--DISREGARD: Primary | ICD-10-CM

## 2018-02-20 PROCEDURE — 99207 ZZC VEINSOLUTIONS NO CHARGE VISIT: CPT | Performed by: SURGERY

## 2018-02-20 NOTE — MR AVS SNAPSHOT
After Visit Summary   2/20/2018    Airam Medrano    MRN: [id number]           Patient Information     Date Of Birth          1955        Visit Information        Provider Department      2/20/2018 1:00 PM Jayme Rivas MD;  VEIN PROCEDURE ROOM 1 [facility]      Today's Diagnoses     ERRONEOUS ENCOUNTER--DISREGARD    -  1       Follow-ups after your visit        Your next 10 appointments already scheduled     Apr 24, 2018 11:15 AM CDT   Ultrasound Guided Sclerotherapy with Jayme Rivas MD,  VEIN PROCEDURE ROOM 2   [facility]    [address]  [facility] [address] [phone]            May 15, 2018  1:15 PM CDT   Assessment Injection with Possible Treatment with Jayme Rivas MD   [facility]    [address]  [facility] [address] [phone]              Who to contact     If you have questions or need follow up information about today's clinic visit or your schedule please contact [facility] directly at [phone].  Normal or non-critical lab and imaging results will be communicated to you by MyChart, letter or phone within 4 business days after the clinic has received the results. If you do not hear from us within 7 days, please contact the clinic through MyChart or phone. If you have a critical or abnormal lab result, we will notify you by phone as soon as possible.  Submit refill requests through [employer] or call your pharmacy and they will forward the refill request to us. Please allow 3 business days for your refill to be completed.          Additional Information About Your Visit        Mantexhart Information     [employer] lets you send messages to your doctor, view your test results, renew your prescriptions,  "schedule appointments and more. To sign up, go to [email or web address] . Click on \"Log in\" on the left side of the screen, which will take you to the Welcome page. Then click on \"Sign up Now\" on the right side of the page.     You will be asked to enter the access code listed below, as well as some personal information. Please follow the directions to create your username and password.     Your access code is: [email or web address]  Expires: 2018  1:20 PM     Your access code will  in 90 days. If you need help or a new code, please call your [place] clinic or [phone].        Care EveryWhere ID     This is your Care EveryWhere ID. This could be used by other organizations to access your [place] medical records  [id number]         Blood Pressure from Last 3 Encounters:   17 114/76   16 120/60   16 120/72    Weight from Last 3 Encounters:   17 74.4 kg (164 lb)   16 95.3 kg (210 lb)   16 83.9 kg (185 lb)              Today, you had the following     No orders found for display       Primary Care Provider Office Phone # Fax #    Nina Rachele Haase, APRN [phone] [address] [facility] [address]        Equal Access to Services     [facility]: Hadii aad ku hadasho Sojamarcusali, waaxda luqadaha, qaybta kaalmada adeegyada, leda ley . So [facility] [phone].    ATENCIÓN: Si habla español, tiene a aguirre disposición servicios gratuitos de asistencia lingüística. Llame al [phone].    We comply with applicable federal civil rights laws and [place] laws. We do not discriminate on the basis of race, color, national origin, age, disability, sex, sexual orientation, or gender identity.            Thank you!     Thank you for choosing [facility]  for your care. Our goal is always to provide you with excellent care. Hearing back from our patients is one way we can continue to improve our services. " Please take a few minutes to complete the written survey that you may receive in the mail after your visit with us. Thank you!             Your Updated Medication List - Protect others around you: Learn how to safely use, store and throw away your medicines at www.disposemymeds.org.          This list is accurate as of 2/20/18 11:59 PM.  Always use your most recent med list.                   Brand Name Dispense Instructions for use Diagnosis    aspirin 81 MG tablet     100    ONE DAILY    Congenital deficiency of other clotting factors       MOTRIN 600 MG tablet   Generic drug:  ibuprofen      Take 600 mg by mouth every 6 hours as needed. 9062-0188 mg daily

## 2018-02-20 NOTE — PROGRESS NOTES
[facility] Progress Note    Airam Medrano returns in follow-up of ultrasound-guided sclerotherapy of residual bilateral lower extremity varicose veins.  Overall she says she is doing well but admits she still feels some areas of firmness and slight tenderness in both legs.    On exam there is mild induration and tenderness in the mid posterior medial left calf extending up medial to the left knee and into the distal most aspect of the left thigh.  She has similar findings in the distal posterior medial right leg, calf and distal medial aspect of the right thigh.  There are no areas of significant erythema and nothing that I feel needs to be drained at this time.    She will be traveling in 2 weeks and will plan to return for another session of ultrasound-guided sclerotherapy upon return.  She would also like to schedule a separate session of cosmetic sclerotherapy to follow the medically necessary treatment.  We will schedule this for about 3 weeks after her medically necessary sclerotherapy.    JUAN Rivas MD

## 2018-03-28 ENCOUNTER — OFFICE VISIT (OUTPATIENT)
Dept: VASCULAR SURGERY | Facility: CLINIC | Age: 63
End: 2018-03-28
Payer: COMMERCIAL

## 2018-03-28 DIAGNOSIS — Z53.9 ERRONEOUS ENCOUNTER--DISREGARD: Primary | ICD-10-CM

## 2018-03-28 PROCEDURE — 99207 ZZC VEINSOLUTIONS NO CHARGE VISIT: CPT | Performed by: SURGERY

## 2018-03-28 NOTE — MR AVS SNAPSHOT
After Visit Summary   3/28/2018    Airam Medrano    MRN: [id number]           Patient Information     Date Of Birth          1955        Visit Information        Provider Department      3/28/2018 9:30 AM [facility]me Rivas [facility]      Today's Diagnoses     ERRONEOUS ENCOUNTER--DISREGARD    -  1       Follow-ups after your visit        Your next 10 appointments already scheduled     Apr 24, 2018 11:15 AM CDT   Ultrasound Guided Sclerotherapy with Jayme Rivas MD,  VEIN PROCEDURE ROOM 2   [facility]    [address]  [facility] [address]   [phone]            May 15, 2018  1:15 PM CDT   Assessment Injection with Possible Treatment with Jayme Rivas MD   [facility]    [address]  [facility] [address] [phone]              Who to contact     If you have questions or need follow up information about today's clinic visit or your schedule please contact [facility] directly at [phone].  Normal or non-critical lab and imaging results will be communicated to you by MyChart, letter or phone within 4 business days after the clinic has received the results. If you do not hear from us within 7 days, please contact the clinic through MyChart or phone. If you have a critical or abnormal lab result, we will notify you by phone as soon as possible.  Submit refill requests through Agil[employer] or call your pharmacy and they will forward the refill request to us. Please allow 3 business days for your refill to be completed.          Additional Information About Your Visit        VideoLensharNovan Information     [employer] lets you send messages to your doctor, view your test results, renew your prescriptions, schedule appointments and  "more. To sign up, go to [email or web address] . Click on \"Log in\" on the left side of the screen, which will take you to the Welcome page. Then click on \"Sign up Now\" on the right side of the page.     You will be asked to enter the access code listed below, as well as some personal information. Please follow the directions to create your username and password.     Your access code is: [email or web address]  Expires: 2018  1:20 PM     Your access code will  in 90 days. If you need help or a new code, please call your [place] clinic or [phone].        Care EveryWhere ID     This is your Care EveryWhere ID. This could be used by other organizations to access your [place] medical records  [id number]         Blood Pressure from Last 3 Encounters:   17 114/76   16 120/60   16 120/72    Weight from Last 3 Encounters:   17 74.4 kg (164 lb)   16 95.3 kg (210 lb)   16 83.9 kg (185 lb)              Today, you had the following     No orders found for display       Primary Care Provider Office Phone # Fax #    Susan Rachele Haase, APRN [phone] [address] [facility] [address]        Equal Access to Services     [facility]: Hadii eyad bocanegrao Soprasad, waaxda luqadaha, qaybta kaalmada adejaironyada, leda ley . So [facility] [phone].    ATENCIÓN: Si habla español, tiene a aguirre disposición servicios gratuitos de asistencia lingüística. Llame al [phone].    We comply with applicable federal civil rights laws and [place] laws. We do not discriminate on the basis of race, color, national origin, age, disability, sex, sexual orientation, or gender identity.            Thank you!     Thank you for choosing [facility]  for your care. Our goal is always to provide you with excellent care. Hearing back from our patients is one way we can continue to improve our services. Please take a few minutes " to complete the written survey that you may receive in the mail after your visit with us. Thank you!             Your Updated Medication List - Protect others around you: Learn how to safely use, store and throw away your medicines at www.disposemymeds.org.          This list is accurate as of 3/28/18 11:59 PM.  Always use your most recent med list.                   Brand Name Dispense Instructions for use Diagnosis    aspirin 81 MG tablet     100    ONE DAILY    Congenital deficiency of other clotting factors       MOTRIN 600 MG tablet   Generic drug:  ibuprofen      Take 600 mg by mouth every 6 hours as needed. 0941-3563 mg daily

## 2018-03-28 NOTE — PROGRESS NOTES
[facility] Progress Note    Airam Medrano returns in follow-up of ultrasound-guided sclerotherapy of left lower extremity varicose veins on 1/23/2018.  I saw her in follow-up on 2/20/2018 to check some areas of induration on the mid posterior medial left calf.    About 4-5 days ago she became more erythematous on the posterior medial left calf and was instructed by my office to begin taking aspirin on a twice daily basis and to use warm compresses on the leg.  She states that the discomfort has improved with this therapy.  She returned today for further evaluation.    On physical exam indeed there is induration, erythema and tenderness along the mid posterior medial left calf.  I interrogated this area with ultrasound and note a thrombosed vein deep to this, somewhat smaller than expected given the amount of induration.  I cleaned the area with alcohol and aspirated the area with an 18-gauge needle.  I aspirated into different locations, with return of a small amount of thrombus/liquid blood.  I then expressed some of the blood from the lower of the 2 access points.    We cleaned the area with alcohol thoroughly and that I applied sterile Band-Aids.    She will continue taking the equivalent of one regular strength aspirin twice daily and will continue moist heat etc. to the area.  I have asked her to give me a progress report in 5 days prior to her travel to [place] for her son's wedding.    JUAN Rivas MD

## 2018-04-24 ENCOUNTER — APPOINTMENT (OUTPATIENT)
Dept: VASCULAR SURGERY | Facility: CLINIC | Age: 63
End: 2018-04-24

## 2018-04-24 ENCOUNTER — OFFICE VISIT (OUTPATIENT)
Dept: VASCULAR SURGERY | Facility: CLINIC | Age: 63
End: 2018-04-24
Payer: COMMERCIAL

## 2018-04-24 DIAGNOSIS — Z53.9 ERRONEOUS ENCOUNTER--DISREGARD: Primary | ICD-10-CM

## 2018-04-24 PROCEDURE — S9999 SALES TAX: HCPCS | Performed by: SURGERY

## 2018-04-24 PROCEDURE — 76942 ECHO GUIDE FOR BIOPSY: CPT | Performed by: SURGERY

## 2018-04-24 PROCEDURE — 36471 NJX SCLRSNT MLT INCMPTNT VN: CPT | Performed by: SURGERY

## 2018-04-24 PROCEDURE — 36468 NJX SCLRSNT SPIDER VEINS: CPT | Performed by: SURGERY

## 2018-04-24 NOTE — PROGRESS NOTES
[facility] Procedure Note    Preprocedure diagnosis  1.  Residual right leg pain and varicose veins; status post phlebectomies and medically necessary ultrasound-guided sclerotherapy    Postprocedure diagnosis  Same    Procedure  Medically necessary, ultrasound-guided sclerotherapy multiple (3) symptomatic recurrent varicose veins left leg    Surgeon  JUAN Rivas MD    Procedure description  Details of procedure including risks of allergic reaction, deep vein thrombosis, hyperpigmentation, ulceration and trap blood have been discussed.  Patient appeared to understand and wish to proceed.  Informed consent was obtained.    I had Mrs. Medrano lie supine on our examining table and I interrogated her right medial thigh with ultrasound.  I was able to discern a closed great saphenous vein, previously injected and also noted a large varicosity on the right medial calf that was thrombosed.  There was a  near the mid thigh the communicated with varicosities coursing cephalad and caudad.  I foamed 1 mL of 1% polidocanol with 2 mL of air and injected this into the varicosity and cephalad thigh filling the varicosity down to the area the .  I then accessed the vein coursing distally from the  and injected 1 mL of foam into this vein.    I interrogated the right medial leg and noted that the largest of the varicosities (previously treated) sees was closed.  There was another tributary coursing from the thigh down to the medial calf from the vein that indicated with the  previously treated.  I accessed this vein with 20s 7-gauge needle and injected 2 mL of 1% polidocanol foam and this vein.  The vein went into tight spasm.    I then interrogated the left medial thigh and medial leg and could not find significant patent veins was need to be injected.    After completing the sclerotherapy, we cleaned the legs with saline solution and applied thigh-high compression hose.  We had her walk  for 10-15 minutes prior to getting the car to drive home.    JUAN Rivas MD

## 2018-04-24 NOTE — MR AVS SNAPSHOT
"              After Visit Summary   4/24/2018    Airam Medrano    MRN: [id number]           Patient Information     Date Of Birth          1955        Visit Information        Provider Department      4/24/2018 11:15 AM Jayme Rivas MD; [facility]ED[facility] ROOM 2 [facility]      Today's Diagnoses     ERRONEOUS ENCOUNTER--DISREGARD    -  1       Follow-ups after your visit        Your next 10 appointments already scheduled     May 30, 2018 10:00 AM CDT   Assessment Injection with Possible Treatment with Jayme Rivas MD   [facility] ([facility])    [address]  [facility] [address] [phone]              Who to contact     If you have questions or need follow up information about today's clinic visit or your schedule please contact [facility] directly at [phone].  Normal or non-critical lab and imaging results will be communicated to you by Philanthropediahart, letter or phone within 4 business days after the clinic has received the results. If you do not hear from us within 7 days, please contact the clinic through Philanthropediahart or phone. If you have a critical or abnormal lab result, we will notify you by phone as soon as possible.  Submit refill requests through Knomo or call your pharmacy and they will forward the refill request to us. Please allow 3 business days for your refill to be completed.          Additional Information About Your Visit        PhilanthropediaharCarter-Waters Information     Knomo lets you send messages to your doctor, view your test results, renew your prescriptions, schedule appointments and more. To sign up, go to [email or web address] . Click on \"Log in\" on the left side of the screen, which will take you to the Welcome page. Then click on \"Sign up Now\" on the right side of the page.     You will be asked to enter the access code " listed below, as well as some personal information. Please follow the directions to create your username and password.     Your access code is: [email or web address]  Expires: 2018  1:20 PM     Your access code will  in 90 days. If you need help or a new code, please call your [facility] clinic or [phone].        Care EveryWhere ID     This is your Care EveryWhere ID. This could be used by other organizations to access your [facility] medical records  [id number]         Blood Pressure from Last 3 Encounters:   17 114/76   16 120/60   16 120/72    Weight from Last 3 Encounters:   17 74.4 kg (164 lb)   16 95.3 kg (210 lb)   16 83.9 kg (185 lb)              Today, you had the following     No orders found for display       Primary Care Provider Office Phone # Fax #    Nina Rachele Haase, APRN [phone] [address] [facility] [address]        Equal Access to Services     [facility]: Hadii aad ku hadasho Soprasad, waaxda luqadaha, qaybta kaalmada deanne, leda ley . So [facility] [phone].    ATENCIÓN: Si habla español, tiene a aguirre disposición servicios gratuitos de asistencia lingüística. Mark al [phone].    We comply with applicable federal civil rights laws and [place] laws. We do not discriminate on the basis of race, color, national origin, age, disability, sex, sexual orientation, or gender identity.            Thank you!     Thank you for choosing [facility]  for your care. Our goal is always to provide you with excellent care. Hearing back from our patients is one way we can continue to improve our services. Please take a few minutes to complete the written survey that you may receive in the mail after your visit with us. Thank you!             Your Updated Medication List - Protect others around you: Learn how to safely use, store and throw away your medicines at  www.disposemymeds.org.          This list is accurate as of 4/24/18 11:59 PM.  Always use your most recent med list.                   Brand Name Dispense Instructions for use Diagnosis    aspirin 81 MG tablet     100    ONE DAILY    Congenital deficiency of other clotting factors       MOTRIN 600 MG tablet   Generic drug:  ibuprofen      Take 600 mg by mouth every 6 hours as needed. 3393-5902 mg daily

## 2018-05-23 ENCOUNTER — OFFICE VISIT (OUTPATIENT)
Dept: FAMILY MEDICINE | Facility: CLINIC | Age: 63
End: 2018-05-23
Payer: COMMERCIAL

## 2018-05-23 VITALS
OXYGEN SATURATION: 100 % | RESPIRATION RATE: 10 BRPM | BODY MASS INDEX: 30.53 KG/M2 | SYSTOLIC BLOOD PRESSURE: 112 MMHG | HEIGHT: 66 IN | DIASTOLIC BLOOD PRESSURE: 70 MMHG | TEMPERATURE: 97.6 F | HEART RATE: 50 BPM | WEIGHT: 190 LBS

## 2018-05-23 DIAGNOSIS — M54.41 RIGHT-SIDED LOW BACK PAIN WITH RIGHT-SIDED SCIATICA, UNSPECIFIED CHRONICITY: ICD-10-CM

## 2018-05-23 DIAGNOSIS — D22.9 NEVUS: ICD-10-CM

## 2018-05-23 DIAGNOSIS — Z00.00 ROUTINE GENERAL MEDICAL EXAMINATION AT A HEALTH CARE FACILITY: Primary | ICD-10-CM

## 2018-05-23 LAB
ALBUMIN SERPL-MCNC: 4.1 G/DL (ref 3.4–5)
ALP SERPL-CCNC: 64 U/L (ref 40–150)
ALT SERPL W P-5'-P-CCNC: 22 U/L (ref 0–50)
ANION GAP SERPL CALCULATED.3IONS-SCNC: 7 MMOL/L (ref 3–14)
AST SERPL W P-5'-P-CCNC: 12 U/L (ref 0–45)
BASOPHILS # BLD AUTO: 0.1 10E9/L (ref 0–0.2)
BASOPHILS NFR BLD AUTO: 0.9 %
BILIRUB SERPL-MCNC: 0.4 MG/DL (ref 0.2–1.3)
BUN SERPL-MCNC: 16 MG/DL (ref 7–30)
CALCIUM SERPL-MCNC: 9.2 MG/DL (ref 8.5–10.1)
CHLORIDE SERPL-SCNC: 105 MMOL/L (ref 94–109)
CHOLEST SERPL-MCNC: 194 MG/DL
CO2 SERPL-SCNC: 28 MMOL/L (ref 20–32)
CREAT SERPL-MCNC: 0.88 MG/DL (ref 0.52–1.04)
DIFFERENTIAL METHOD BLD: NORMAL
EOSINOPHIL # BLD AUTO: 0.2 10E9/L (ref 0–0.7)
EOSINOPHIL NFR BLD AUTO: 3.8 %
ERYTHROCYTE [DISTWIDTH] IN BLOOD BY AUTOMATED COUNT: 13.7 % (ref 10–15)
GFR SERPL CREATININE-BSD FRML MDRD: 65 ML/MIN/1.7M2
GLUCOSE SERPL-MCNC: 90 MG/DL (ref 70–99)
HCT VFR BLD AUTO: 41.5 % (ref 35–47)
HDLC SERPL-MCNC: 80 MG/DL
HGB BLD-MCNC: 13.9 G/DL (ref 11.7–15.7)
LDLC SERPL CALC-MCNC: 102 MG/DL
LYMPHOCYTES # BLD AUTO: 1.8 10E9/L (ref 0.8–5.3)
LYMPHOCYTES NFR BLD AUTO: 33.2 %
MCH RBC QN AUTO: 32.2 PG (ref 26.5–33)
MCHC RBC AUTO-ENTMCNC: 33.5 G/DL (ref 31.5–36.5)
MCV RBC AUTO: 96 FL (ref 78–100)
MONOCYTES # BLD AUTO: 0.5 10E9/L (ref 0–1.3)
MONOCYTES NFR BLD AUTO: 8.3 %
NEUTROPHILS # BLD AUTO: 3 10E9/L (ref 1.6–8.3)
NEUTROPHILS NFR BLD AUTO: 53.8 %
NONHDLC SERPL-MCNC: 114 MG/DL
PLATELET # BLD AUTO: 206 10E9/L (ref 150–450)
POTASSIUM SERPL-SCNC: 4.2 MMOL/L (ref 3.4–5.3)
PROT SERPL-MCNC: 7.4 G/DL (ref 6.8–8.8)
RBC # BLD AUTO: 4.32 10E12/L (ref 3.8–5.2)
SODIUM SERPL-SCNC: 140 MMOL/L (ref 133–144)
TRIGL SERPL-MCNC: 58 MG/DL
TSH SERPL DL<=0.005 MIU/L-ACNC: 2.73 MU/L (ref 0.4–4)
WBC # BLD AUTO: 5.5 10E9/L (ref 4–11)

## 2018-05-23 PROCEDURE — 99396 PREV VISIT EST AGE 40-64: CPT | Performed by: NURSE PRACTITIONER

## 2018-05-23 PROCEDURE — 85025 COMPLETE CBC W/AUTO DIFF WBC: CPT | Performed by: NURSE PRACTITIONER

## 2018-05-23 PROCEDURE — 36415 COLL VENOUS BLD VENIPUNCTURE: CPT | Performed by: NURSE PRACTITIONER

## 2018-05-23 PROCEDURE — 80061 LIPID PANEL: CPT | Performed by: NURSE PRACTITIONER

## 2018-05-23 PROCEDURE — 80053 COMPREHEN METABOLIC PANEL: CPT | Performed by: NURSE PRACTITIONER

## 2018-05-23 PROCEDURE — 84443 ASSAY THYROID STIM HORMONE: CPT | Performed by: NURSE PRACTITIONER

## 2018-05-23 RX ORDER — CYCLOBENZAPRINE HCL 10 MG
10 TABLET ORAL 3 TIMES DAILY PRN
Qty: 30 TABLET | Refills: 3 | Status: SHIPPED | OUTPATIENT
Start: 2018-05-23 | End: 2020-07-07

## 2018-05-23 NOTE — MR AVS SNAPSHOT
After Visit Summary   5/23/2018    Airam Medrano    MRN: [id number]           Patient Information     Date Of Birth          1955        Visit Information        Provider Department      5/23/2018 9:30 AM Haase, Susan Rachele, APRN [facility]        Today's Diagnoses     Routine general medical examination at a health care facility    -  1    Right-sided low back pain with right-sided sciatica, unspecified chronicity          Care Instructions      Preventive Health Recommendations  Female Ages 50 - 64    Yearly exam: See your health care provider every year in order to  o Review health changes.   o Discuss preventive care.    o Review your medicines if your doctor has prescribed any.      Get a Pap test every three years (unless you have an abnormal result and your provider advises testing more often).    If you get Pap tests with HPV test, you only need to test every 5 years, unless you have an abnormal result.     You do not need a Pap test if your uterus was removed (hysterectomy) and you have not had cancer.    You should be tested each year for STDs (sexually transmitted diseases) if you're at risk.     Have a mammogram every 1 to 2 years.    Have a colonoscopy at age 50, or have a yearly FIT test (stool test). These exams screen for colon cancer.      Have a cholesterol test every 5 years, or more often if advised.    Have a diabetes test (fasting glucose) every three years. If you are at risk for diabetes, you should have this test more often.     If you are at risk for osteoporosis (brittle bone disease), think about having a bone density scan (DEXA).    Shots: Get a flu shot each year. Get a tetanus shot every 10 years.    Nutrition:     Eat at least 5 servings of fruits and vegetables each day.    Eat whole-grain bread, whole-wheat pasta and brown rice instead of white grains and rice.    Talk to your provider about Calcium and Vitamin D.  "    Lifestyle    Exercise at least 150 minutes a week (30 minutes a day, 5 days a week). This will help you control your weight and prevent disease.    Limit alcohol to one drink per day.    No smoking.     Wear sunscreen to prevent skin cancer.     See your dentist every six months for an exam and cleaning.    See your eye doctor every 1 to 2 years.            Follow-ups after your visit        Follow-up notes from your care team     Return in about 1 year (around 5/23/2019) for Physical Exam.      Your next 10 appointments already scheduled     May 24, 2018 11:15 AM CDT   MA SCREENING DIGITAL BILATERAL with CRMA1   [facility]    [id number] [place] [address] [phone]           Do not use any powder, lotion or deodorant under your arms or on your breast. If you do, we will ask you to remove it before your exam.  Wear comfortable, two-piece clothing.  If you have any allergies, tell your care team.  Bring any previous mammograms from other facilities or have them mailed to the breast center. Three-dimensional (3D) mammograms are available at [place] locations in [facility], [facility], [place], [place], and [place]. [facility] locations include [place] and [facility] in [place]. Benefits of 3D mammograms include: - Improved rate of cancer detection - Decreases your chance of having to go back for more tests, which means fewer: - \"False-positive\" results (This means that there is an abnormal area but it isn't cancer.) - Invasive testing procedures, such as a biopsy or surgery - Can provide clearer images of the breast if you have dense breast tissue. 3D mammography is an optional exam that anyone can have with a 2D mammogram. It doesn't replace or take the place of a 2D mammogram. 2D mammograms remain an effective screening test for all women.  Not all insurance companies cover the cost " "of a 3D mammogram. Check with your insurance.            May 30, 2018 10:00 AM CDT   Assessment Injection with Possible Treatment with Jayme Rivas MD   [facility]    [address], [address]  [facility] [id number] [phone]              Who to contact     If you have questions or need follow up information about today's clinic visit or your schedule please contact [facility] directly at [phone].  Normal or non-critical lab and imaging results will be communicated to you by Actimohart, letter or phone within 4 business days after the clinic has received the results. If you do not hear from us within 7 days, please contact the clinic through Graft Conceptst or phone. If you have a critical or abnormal lab result, we will notify you by phone as soon as possible.  Submit refill requests through Wireless Tech or call your pharmacy and they will forward the refill request to us. Please allow 3 business days for your refill to be completed.          Additional Information About Your Visit        Wireless Tech Information     Wireless Tech lets you send messages to your doctor, view your test results, renew your prescriptions, schedule appointments and more. To sign up, go to [email or web address] . Click on \"Log in\" on the left side of the screen, which will take you to the Welcome page. Then click on \"Sign up Now\" on the right side of the page.     You will be asked to enter the access code listed below, as well as some personal information. Please follow the directions to create your username and password.     Your access code is: [email or web address]  Expires: 2018  9:33 AM     Your access code will  in 90 days. If you need help or a new code, please call your [facility] or [phone].        Care EveryWhere ID     This is your Care EveryWhere ID. This could be used by other organizations to access your [place] medical " "records  [id number]        Your Vitals Were     Pulse Temperature Respirations Height Pulse Oximetry BMI (Body Mass Index)    50 97.6  F (36.4  C) (Oral) 10 5' 6\" (1.676 m) 100% 30.67 kg/m2       Blood Pressure from Last 3 Encounters:   05/23/18 112/70   09/12/17 114/76   12/12/16 120/60    Weight from Last 3 Encounters:   05/23/18 190 lb (86.2 kg)   09/12/17 164 lb (74.4 kg)   12/12/16 210 lb (95.3 kg)              We Performed the Following     CBC with platelets differential     Comprehensive metabolic panel     Lipid panel reflex to direct LDL Fasting     TSH with free T4 reflex          Today's Medication Changes          These changes are accurate as of 5/23/18 10:03 AM.  If you have any questions, ask your nurse or doctor.               Start taking these medicines.        Dose/Directions    cyclobenzaprine 10 MG tablet   Commonly known as:  FLEXERIL   Used for:  Right-sided low back pain with right-sided sciatica, unspecified chronicity   Started by:  Haase, Susan Rachele, APRN CNP        Dose:  10 mg   Take 1 tablet (10 mg) by mouth 3 times daily as needed for muscle spasms   Quantity:  30 tablet   Refills:  3            Where to get your medicines      These medications were sent to [facility] [place] [address]     Phone:  [phone]     cyclobenzaprine 10 MG tablet                Primary Care Provider Office Phone # Fax #    Susan Rachele Haase, APRN [phone] [address] [place] [id number]        Equal Access to Services     [facility]: Hadnoa hutton Soprasad, waaxda luqadaha, qaybta kaalmada deanne, leda vallecillo. So [facility] [phone].    ATENCIÓN: Si habla español, tiene a aguirre disposición servicios gratuitos de asistencia lingüística. Mark cortes [phone].    We comply with applicable federal civil rights laws and [place] laws. We do not " discriminate on the basis of race, color, national origin, age, disability, sex, sexual orientation, or gender identity.            Thank you!     Thank you for choosing [facility]  for your care. Our goal is always to provide you with excellent care. Hearing back from our patients is one way we can continue to improve our services. Please take a few minutes to complete the written survey that you may receive in the mail after your visit with us. Thank you!             Your Updated Medication List - Protect others around you: Learn how to safely use, store and throw away your medicines at www.disposemymeds.org.          This list is accurate as of 5/23/18 10:03 AM.  Always use your most recent med list.                   Brand Name Dispense Instructions for use Diagnosis    aspirin 81 MG tablet     100    ONE DAILY    Congenital deficiency of other clotting factors       cyclobenzaprine 10 MG tablet    FLEXERIL    30 tablet    Take 1 tablet (10 mg) by mouth 3 times daily as needed for muscle spasms    Right-sided low back pain with right-sided sciatica, unspecified chronicity       MOTRIN 600 MG tablet   Generic drug:  ibuprofen      Take 600 mg by mouth every 6 hours as needed. 3793-4178 mg daily

## 2018-05-23 NOTE — PROGRESS NOTES
Carl Alegria,   Your lab results are as below:  1)  TSH (thyroid level) 2.73  which is normal (range 0.4-4)  2)  Cholesterol was normal at 194, your LDL (bad cholesterol) and your HDL (good cholesterol) were also within normal range. Continue to follow a low cholesterol diet and we will recheck this in 1 year.  3)  Glucose was normal at 90 (normal fasting is <100).      If you have any questions do not hesitate to call the clinic to discuss the results with me further.     Sincerely,    Susan Haase, CNP

## 2018-05-23 NOTE — PROGRESS NOTES
SUBJECTIVE:   CC: Airam Medrano is an 62 year old woman who presents for preventive health visit.     Physical   Annual:     Getting at least 3 servings of Calcium per day::  Yes    Bi-annual eye exam::  Yes    Dental care twice a year::  Yes    Sleep apnea or symptoms of sleep apnea::  None    Diet::  Regular (no restrictions)    Taking medications regularly::  Yes    Medication side effects::  None    Additional concerns today::  No          Cervical cancer screening: Last pap completed at [facility] in 2016, results normal. Denies vaginal discharge.     Breast cancer screening: Mammogram scheduled for 08/25. Last mammo 2016, results normal. Recommend annual screening.     Colon cancer screening: Last colonoscopy 2014, results found 12 mm sessile polyp in cecum. Polyp removed.        Back pain: Low back pain and right sciatica flare up due to gardening. Sit to stand motion is stiff. She completes stretches for pain, also takes ibuprofen irregularly. Denies lower extremity numbness, tingling, weakness or bowel/bladder dysfunction.    Weight gain: Concerned about recent weight gain. She's removed salt from diet, struggles to eliminate carbs and sugars.      Skin: Mole on back removed one year ago, it has not completely fill in. Pathology was normal. Plans to follow up with dermatology for skin check soon.     Today's PHQ-2 Score:   PHQ-2 ( 1999 [employer]) 5/23/2018   Q1: Little interest or pleasure in doing things 0   Q2: Feeling down, depressed or hopeless 0   PHQ-2 Score 0   Q1: Little interest or pleasure in doing things Not at all   Q2: Feeling down, depressed or hopeless Not at all   PHQ-2 Score 0       Abuse: Current or Past(Physical, Sexual or Emotional)- No  Do you feel safe in your environment - Yes    Social History   Substance Use Topics     Smoking status: Never Smoker     Smokeless tobacco: Never Used     Alcohol use Yes      Comment: 1-2 drinks per week     Alcohol Use 5/23/2018   If you  drink alcohol do you typically have greater than 3 drinks per day OR greater than 7 drinks per week? No   No flowsheet data found.    Reviewed orders with patient.  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)     CARDIOVASCULAR SCREENING; LDL GOAL LESS THAN 160     Advance Care Planning     Right-sided low back pain with right-sided sciatica, unspecified chronicity     Past Surgical History:   Procedure Laterality Date     C NONSPECIFIC PROCEDURE  1989-90    varicose vein stripping     ORTHOPEDIC SURGERY      meniscus repair of left knee     VASCULAR SURGERY      veinectomy x2       Social History   Substance Use Topics     Smoking status: Never Smoker     Smokeless tobacco: Never Used     Alcohol use Yes      Comment: 1-2 drinks per week     Family History   Problem Relation Age of Onset     CEREBROVASCULAR DISEASE Mother      Arthritis Father      Genitourinary Problems Maternal Grandmother      HEART DISEASE Maternal Grandfather      CANCER Paternal Grandmother      CANCER Paternal Grandfather          Current Outpatient Prescriptions   Medication Sig Dispense Refill     ASPIRIN 81 MG OR TABS ONE DAILY 100 3     cyclobenzaprine (FLEXERIL) 10 MG tablet Take 1 tablet (10 mg) by mouth 3 times daily as needed for muscle spasms 30 tablet 3     ibuprofen (MOTRIN) 600 MG tablet Take 600 mg by mouth every 6 hours as needed. 7319-9379 mg daily       Allergies   Allergen Reactions     No Known Drug Allergies        Patient over age 50, mutual decision to screen reflected in health maintenance.    Pertinent mammograms are reviewed under the imaging tab.  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     Irregular menstrual cycle 2000      Past Surgical History:   Procedure Laterality Date     C NONSPECIFIC PROCEDURE  1989-90    varicose vein stripping     ORTHOPEDIC SURGERY      meniscus repair of left knee     VASCULAR SURGERY      veinectomy x2       Review of Systems  CONSTITUTIONAL: POSITIVE for change in weight NEGATIVE for fever, chills  INTEGUMENTARY/SKIN: NEGATIVE for worrisome rashes, moles or lesions  EYES: NEGATIVE for vision changes or irritation  ENT: NEGATIVE for ear, mouth and throat problems  RESP: NEGATIVE for significant cough or SOB  BREAST: NEGATIVE for masses, tenderness or discharge  CV: NEGATIVE for chest pain, palpitations or peripheral edema  GI: NEGATIVE for nausea, abdominal pain, heartburn, or change in bowel habits  : NEGATIVE for unusual urinary or vaginal symptoms. No vaginal bleeding.  MUSCULOSKELETAL: NEGATIVE for significant arthralgias or myalgia  NEURO: NEGATIVE for weakness, dizziness or paresthesias  PSYCHIATRIC: NEGATIVE for changes in mood or affect      This document serves as a record of the services and decisions personally performed and made by Susan Haase, CNP. It was created on her behalf by Marion Bray, a trained medical scribe. The creation of this document is based on the provider's statements to the medical scribe.  Marion Bray 9:52 AM May 23, 2018  OBJECTIVE:   /70 (BP Location: Right arm, Patient Position: Chair, Cuff Size: Adult Regular)  Pulse 50  Temp 97.6  F (36.4  C) (Oral)  Resp 10  Ht 1.676 m (5' 6\")  Wt 86.2 kg (190 lb)  SpO2 100%  BMI 30.67 kg/m2  Physical Exam  GENERAL APPEARANCE: healthy, alert and no distress  EYES: Eyes " grossly normal to inspection, PERRL and conjunctivae and sclerae normal  HENT: ear canals and TM's normal, nose and mouth without ulcers or lesions, oropharynx clear and oral mucous membranes moist  NECK: no adenopathy, no asymmetry, masses, or scars and thyroid normal to palpation  RESP: lungs clear to auscultation - no rales, rhonchi or wheezes  BREAST: normal without masses, tenderness or nipple discharge and no palpable axillary masses or adenopathy  CV: regular rate and rhythm, normal S1 S2, no S3 or S4, no murmur, click or rub, no peripheral edema and peripheral pulses strong  ABDOMEN: soft, nontender, no hepatosplenomegaly, no masses and bowel sounds normal  MS: no musculoskeletal defects are noted and gait is age appropriate without ataxia  SKIN: no suspicious lesions or rashes  NEURO: Normal strength and tone, sensory exam grossly normal, mentation intact and speech normal  PSYCH: mentation appears normal and affect normal/bright    ASSESSMENT/PLAN:   Airam was seen today for physical.    Diagnoses and all orders for this visit:    Routine general medical examination at a health care facility  -     CBC with platelets differential  -     Comprehensive metabolic panel  -     Lipid panel reflex to direct LDL Fasting  -     TSH with free T4 reflex    Right-sided low back pain with right-sided sciatica, unspecified chronicity:  Caused by recent gardening, suggested stretching exercise, ice for 15-20 min tid and flexeril at hs  -     cyclobenzaprine (FLEXERIL) 10 MG tablet; Take 1 tablet (10 mg) by mouth 3 times daily as needed for muscle spasms    Nevus:  Will follow up with dermatology for annual skin check.     COUNSELING:  Reviewed preventive health counseling, as reflected in patient instructions       Regular exercise       Healthy diet/nutrition       Colon cancer screening      reports that she has never smoked. She has never used smokeless tobacco.    Estimated body mass index is 30.67 kg/(m^2) as  "calculated from the following:    Height as of this encounter: 1.676 m (5' 6\").    Weight as of this encounter: 86.2 kg (190 lb).   Weight management plan: Discussed healthy diet and exercise guidelines and patient will follow up in 12 months in clinic to re-evaluate.    Counseling Resources:  ATP IV Guidelines  Pooled Cohorts Equation Calculator  Breast Cancer Risk Calculator  FRAX Risk Assessment  ICSI Preventive Guidelines  Dietary Guidelines for Americans, 2010  USDA's MyPlate  ASA Prophylaxis  Lung CA Screening  Follow up in 1 year, sooner as needed.   The information in this document, created by the medical scribe for me, accurately reflects the services I personally performed and the decisions made by me. I have reviewed and approved this document for accuracy prior to leaving the patient care area.  May 23, 2018 9:53 AM  Susan Haase, APRN [facility]  "

## 2018-05-30 ENCOUNTER — APPOINTMENT (OUTPATIENT)
Dept: VASCULAR SURGERY | Facility: CLINIC | Age: 63
End: 2018-05-30

## 2018-05-30 PROCEDURE — 99207 ZZC VEINSOLUTIONS NO CHARGE VISIT: CPT | Performed by: SURGERY

## 2018-05-31 ENCOUNTER — TELEPHONE (OUTPATIENT)
Dept: FAMILY MEDICINE | Facility: CLINIC | Age: 63
End: 2018-05-31

## 2018-05-31 NOTE — TELEPHONE ENCOUNTER
Can you please call [facility] and ask what the recommendations are for the colonoscopy that was done in 2014, Dr Castellanos completed the test.  It is unclear if she needs follow up colonoscopy in 2 or 10 years. The patient said she doesn't recall getting a letter with the final results and recommendation.     Thanks,  Susan Haase, CNP    See results from colonosocpy:  Impression:               - One 12 mm polyp in the cecum. Resected and                             retrieved.   Recommendation:           If polyp is an adenoma, repeat in two years,                             otherwise ten.       Cecal polyp     FINAL DIAGNOSIS:   Cecum, polyp, biopsy/polypectomy-   -Fragments of sessile serrated adenoma.   -Polyp size: 12 mm (per endoscopy report).   -Negative for high-grade dysplasia or malignancy.

## 2018-05-31 NOTE — TELEPHONE ENCOUNTER
Called Dr Castellanos's office, pt did have f/u colonoscopy 2016, will fax records, will watch for fax  Doris Cuevas RN, BSN  Message handled by Nurse Triage.

## 2018-05-31 NOTE — TELEPHONE ENCOUNTER
SH, see colonoscopy report, pt had f/u in 2016, see report, ? Not clear about f/u, please advise  Doris Cuevas RN, BSN  Message handled by Nurse Triage.

## 2018-07-24 ENCOUNTER — TRANSFERRED RECORDS (OUTPATIENT)
Dept: HEALTH INFORMATION MANAGEMENT | Facility: CLINIC | Age: 63
End: 2018-07-24

## 2019-02-21 ENCOUNTER — THERAPY VISIT (OUTPATIENT)
Dept: PHYSICAL THERAPY | Facility: CLINIC | Age: 64
End: 2019-02-21
Payer: COMMERCIAL

## 2019-02-21 DIAGNOSIS — M25.551 RIGHT HIP PAIN: ICD-10-CM

## 2019-02-21 PROCEDURE — 97110 THERAPEUTIC EXERCISES: CPT | Mod: GP | Performed by: PHYSICAL THERAPIST

## 2019-02-21 PROCEDURE — 97161 PT EVAL LOW COMPLEX 20 MIN: CPT | Mod: GP | Performed by: PHYSICAL THERAPIST

## 2019-02-21 ASSESSMENT — ACTIVITIES OF DAILY LIVING (ADL)
GETTING_INTO_AND_OUT_OF_AN_AVERAGE_CAR: SLIGHT DIFFICULTY
TWISTING/PIVOTING_ON_INVOLVED_LEG: MODERATE DIFFICULTY
GOING_UP_1_FLIGHT_OF_STAIRS: SLIGHT DIFFICULTY
HOS_ADL_ITEM_SCORE_TOTAL: 50
WALKING_DOWN_STEEP_HILLS: SLIGHT DIFFICULTY
SITTING_FOR_15_MINUTES: SLIGHT DIFFICULTY
WALKING_15_MINUTES_OR_GREATER: SLIGHT DIFFICULTY
STANDING_FOR_15_MINUTES: NO DIFFICULTY AT ALL
STEPPING_UP_AND_DOWN_CURBS: SLIGHT DIFFICULTY
DEEP_SQUATTING: MODERATE DIFFICULTY
RECREATIONAL_ACTIVITIES: MODERATE DIFFICULTY
HOS_ADL_COUNT: 17
PUTTING_ON_SOCKS_AND_SHOES: NO DIFFICULTY AT ALL
GETTING_INTO_AND_OUT_OF_A_BATHTUB: NO DIFFICULTY AT ALL
WALKING_INITIALLY: SLIGHT DIFFICULTY
WALKING_APPROXIMATELY_10_MINUTES: NO DIFFICULTY AT ALL
HOS_ADL_SCORE(%): 73.53
WALKING_UP_STEEP_HILLS: SLIGHT DIFFICULTY
ROLLING_OVER_IN_BED: MODERATE DIFFICULTY
HEAVY_WORK: MODERATE DIFFICULTY
HOS_ADL_HIGHEST_POTENTIAL_SCORE: 68
HOW_WOULD_YOU_RATE_YOUR_CURRENT_LEVEL_OF_FUNCTION_DURING_YOUR_USUAL_ACTIVITIES_OF_DAILY_LIVING_FROM_0_TO_100_WITH_100_BEING_YOUR_LEVEL_OF_FUNCTION_PRIOR_TO_YOUR_HIP_PROBLEM_AND_0_BEING_THE_INABILITY_TO_PERFORM_ANY_OF_YOUR_USUAL_DAILY_ACTIVITIES?: 75
GOING_DOWN_1_FLIGHT_OF_STAIRS: NO DIFFICULTY AT ALL
LIGHT_TO_MODERATE_WORK: SLIGHT DIFFICULTY

## 2019-02-21 NOTE — PROGRESS NOTES
[facility] Initial Evaluation  Subjective:  The history is provided by the patient. No  was used.   Airam Medrano is a 63 year old female with a right hip condition.  Condition occurred with:  Insidious onset.  Condition occurred: for unknown reasons.  This is a chronic condition  Patient reports an onset of right lateral hip pain beginning about a year ago with a long car ride.  Patient c/o pain with prolong sitting, especially in car.  Patient has a history low back pain issues.  Pain is better when she is walking/moving/active.  Patient reports right lower extremity numbness/tingling when her hip is more painful.  Repeated lumbar extension is helpful.    Patient reports pain:  Lateral.  Radiates to:  Foot.  Pain is described as aching  and reported as 3/10 (0-8/10).  Associated symptoms:  Numbness, tingling and loss of motion/stiffness. Pain is the same all the time.  Symptoms are exacerbated by sitting and relieved by activity/movement and NSAID's.  Since onset symptoms are unchanged.  Special testing: none recently, previous MRI Lumbar.  Previous treatment includes physical therapy.  There was no improvement following previous treatment.  General health as reported by patient is good.  Pertinent medical history includes:  Overweight.  Medical allergies: no.  Other surgeries include:  Orthopedic surgery and other.  Current medications:  Anti-inflammatory.  Current occupation is Retired.        Barriers include:  None as reported by the patient.    Red flags:  None as reported by the patient.                        Objective:    Gait:    Gait Type:  Normal                    Lumbar/SI Evaluation  ROM:    AROM Lumbar:   Flexion:        WNL - LBP w/return  Ext:                    Min/Mod Loss - ROM improves with repeated movement   Side Bend:        Left:     Right:   Rotation:           Left:     Right:   Side Glide:        Left:     Right:         Strength: Fair core  strength  Lumbar Myotomes:    T12-L3 (Hip Flex):  Left: 5    Right: 5  L2-4 (Quads):  Left:  5    Right:  5  L4 (Ankle DF):  Left:  5    Right:  5  L5 (Great Toe Ext): Left: 5    Right: 5   S1 (Toe Raise):  Left: 5    Right: 5                                                    Hip Evaluation  HIP AROM:  AROM:   Left Hip:     Normal    Right Hip:                    Hip PROM:  Hip PROM:  Left Hip:   Normal  Right Hip:                           Hip Strength:        Abduction:  Left: 4/5     Pain:Right: 4-/5    Pain:                  Hip Special Testing:      Right hip negative for the following special tests:  Lasha; Fadir/Labrum or SLR    Hip Palpation:      Right hip tenderness present at:  Piriformis  Right hip tenderness not present at:  Greater Trachanter; Gluteus Medius or Bursa             General     ROS    Assessment/Plan:    Patient is a 63 year old female with right side hip complaints.    Patient has the following significant findings with corresponding treatment plan.                Diagnosis 1:  Probable Lumbar Derangement vs Greater Trochanter Pain  Pain -  self management, education and home program  Decreased ROM/flexibility - therapeutic exercise, therapeutic activity and home program  Decreased strength - therapeutic exercise, therapeutic activities and home program  Impaired muscle performance - neuro re-education and home program  Decreased function - therapeutic activities and home program    Therapy Evaluation Codes:   1) History comprised of:   Personal factors that impact the plan of care:      None.    Comorbidity factors that impact the plan of care are:      Overweight.     Medications impacting care: Anti-inflammatory.  2) Examination of Body Systems comprised of:   Body structures and functions that impact the plan of care:      Hip and Lumbar spine.   Activity limitations that impact the plan of care are:      Sitting.  3) Clinical presentation characteristics  are:   Stable/Uncomplicated.  4) Decision-Making    Low complexity using standardized patient assessment instrument and/or measureable assessment of functional outcome.  Cumulative Therapy Evaluation is: Low complexity.    Previous and current functional limitations:  (See Goal Flow Sheet for this information)    Short term and Long term goals: (See Goal Flow Sheet for this information)     Communication ability:  Patient appears to be able to clearly communicate and understand verbal and written communication and follow directions correctly.  Treatment Explanation - The following has been discussed with the patient:   RX ordered/plan of care  Anticipated outcomes  Possible risks and side effects  This patient would benefit from PT intervention to resume normal activities.   Rehab potential is good.    Frequency:  1 X week, once daily  Duration:  for 6 weeks  Discharge Plan:  Achieve all LTG.  Independent in home treatment program.  Reach maximal therapeutic benefit.    Please refer to the daily flowsheet for treatment today, total treatment time and time spent performing 1:1 timed codes.

## 2019-02-21 NOTE — LETTER
[facility] PHYSICAL THERAPY  [address]  [facility] [address]  [phone]    2019    Re: Airam Medrano   :   1955  MRN:  [id number]   REFERRING PHYSICIAN:   Geronimo Fair    [facility]  Date of Initial Evaluation:  19  Visits:  Rxs Used: 1  Reason for Referral:  Right hip pain    EVALUATION SUMMARY    [facility]  Subjective:  The history is provided by the patient. No  was used.   Airam Medrano is a 63 year old female with a right hip condition.  Condition occurred with:  Insidious onset.  Condition occurred: for unknown reasons.  This is a chronic condition  Patient reports an onset of right lateral hip pain beginning about a year ago with a long car ride.  Patient c/o pain with prolong sitting, especially in car.  Patient has a history low back pain issues.  Pain is better when she is walking/moving/active.  Patient reports right lower extremity numbness/tingling when her hip is more painful.  Repeated lumbar extension is helpful.    Patient reports pain:  Lateral.  Radiates to:  Foot.  Pain is described as aching  and reported as 3/10 (0-8/10).  Associated symptoms:  Numbness, tingling and loss of motion/stiffness. Pain is the same all the time.  Symptoms are exacerbated by sitting and relieved by activity/movement and NSAID's.  Since onset symptoms are unchanged.  Special testing: none recently, previous MRI Lumbar.  Previous treatment includes physical therapy.  There was no improvement following previous treatment.  General health as reported by patient is good.  Pertinent medical history includes:  Overweight.  Medical allergies: no.  Other surgeries include:  Orthopedic surgery and other.  Current medications:  Anti-inflammatory.  Current occupation is Retired.      Barriers include:  None as reported by the  patient.  Red flags:  None as reported by the patient.                Objective:  Gait:  Gait Type:  Normal         Lumbar/SI Evaluation  ROM:    AROM Lumbar:   Flexion:        WNL - LBP w/return  Ext:                    Min/Mod Loss - ROM improves with repeated movement   Side Bend:        Left:     Right:   Rotation:           Left:     Right:   Side Glide:        Left:     Right:   Strength: Fair core strength  Lumbar Myotomes:    T12-L3 (Hip Flex):  Left: 5    Right: 5  L2-4 (Quads):  Left:  5    Right:  5  L4 (Ankle DF):  Left:  5    Right:  5  L5 (Great Toe Ext): Left: 5    Right: 5   S1 (Toe Raise):  Left: 5    Right: 5    Re: Airam Medrano   :   1955        Hip Evaluation  HIP AROM:  AROM:   Left Hip:     Normal    Right Hip:    Hip PROM:  Hip PROM:  Left Hip:   Normal  Right Hip:   Hip Strength:    Abduction:  Left: 4/5     Pain:Right: 4-/5    Pain:  Hip Special Testing:    Right hip negative for the following special tests:  Lasha; Fadir/Labrum or SLR    Hip Palpation:    Right hip tenderness present at:  Piriformis  Right hip tenderness not present at:  Greater Trachanter; Gluteus Medius or Bursa    Assessment/Plan:    Patient is a 63 year old female with right side hip complaints.    Patient has the following significant findings with corresponding treatment plan.                Diagnosis 1:  Probable Lumbar Derangement vs Greater Trochanter Pain  Pain -  self management, education and home program  Decreased ROM/flexibility - therapeutic exercise, therapeutic activity and home program  Decreased strength - therapeutic exercise, therapeutic activities and home program  Impaired muscle performance - neuro re-education and home program  Decreased function - therapeutic activities and home program    Therapy Evaluation Codes:   1) History comprised of:   Personal factors that impact the plan of care:      None.    Comorbidity factors that impact the plan of care are:      Overweight.      Medications impacting care: Anti-inflammatory.  2) Examination of Body Systems comprised of:   Body structures and functions that impact the plan of care:      Hip and Lumbar spine.   Activity limitations that impact the plan of care are:      Sitting.  3) Clinical presentation characteristics are:   Stable/Uncomplicated.  4) Decision-Making    Low complexity using standardized patient assessment instrument and/or measureable assessment of functional outcome.  Cumulative Therapy Evaluation is: Low complexity.    Previous and current functional limitations:  (See Goal Flow Sheet for this information)    Short term and Long term goals: (See Goal Flow Sheet for this information)     Communication ability:  Patient appears to be able to clearly communicate and understand verbal and written communication and follow directions correctly.  Treatment Explanation - The following has been discussed with the patient:   RX ordered/plan of care      Re: Airam Medrano   :   1955            Anticipated outcomes  Possible risks and side effects  This patient would benefit from PT intervention to resume normal activities.   Rehab potential is good.    Frequency:  1 X week, once daily  Duration:  for 6 weeks  Discharge Plan:  Achieve all LTG.  Independent in home treatment program.  Reach maximal therapeutic benefit.    Thank you for your referral.    INQUIRIES  Therapist: Kit Lew, [facility]  [facility]  [id number] [place] [address] [address] [facility] [address]  [facility] [address]  Phone: [phone]  Fax: [phone]

## 2019-12-09 ENCOUNTER — HEALTH MAINTENANCE LETTER (OUTPATIENT)
Age: 64
End: 2019-12-09

## 2020-01-25 DIAGNOSIS — M54.41 RIGHT-SIDED LOW BACK PAIN WITH RIGHT-SIDED SCIATICA, UNSPECIFIED CHRONICITY: ICD-10-CM

## 2020-01-27 RX ORDER — CYCLOBENZAPRINE HCL 10 MG
TABLET ORAL
Qty: 30 TABLET | Refills: 3 | OUTPATIENT
Start: 2020-01-27

## 2020-01-27 NOTE — TELEPHONE ENCOUNTER
01/27/2020    Pt called stated that she went to ER this weekend and got cyclobenzaprine (FLEXERIL) 10 MG tablet filled. Just wanted Haase's team to know its no longer needed.    Lu Fabian Patient Representive

## 2020-01-30 ENCOUNTER — THERAPY VISIT (OUTPATIENT)
Dept: PHYSICAL THERAPY | Facility: CLINIC | Age: 65
End: 2020-01-30
Payer: COMMERCIAL

## 2020-01-30 DIAGNOSIS — M54.41 CHRONIC BILATERAL LOW BACK PAIN WITH RIGHT-SIDED SCIATICA: ICD-10-CM

## 2020-01-30 DIAGNOSIS — G89.29 CHRONIC BILATERAL LOW BACK PAIN WITH RIGHT-SIDED SCIATICA: ICD-10-CM

## 2020-01-30 PROCEDURE — 97110 THERAPEUTIC EXERCISES: CPT | Mod: GP | Performed by: PHYSICAL THERAPIST

## 2020-01-30 PROCEDURE — 97161 PT EVAL LOW COMPLEX 20 MIN: CPT | Mod: GP | Performed by: PHYSICAL THERAPIST

## 2020-01-30 NOTE — LETTER
[facility]  [address]   [facility] [address]  [phone]    2020    Re: Airam Medrano   :   1955  MRN:  [id number]   REFERRING PHYSICIAN:   Abisai Escobar    [facility]  Date of Initial Evaluation:  20  Visits:  Rxs Used: 1  Reason for Referral:  Chronic bilateral low back pain with right-sided sciatica    EVALUATION SUMMARY    [facility] Initial Evaluation  Subjective:  The history is provided by the patient. No  was used.   Airam Medrano being seen for Low back pain.   Problem began 2020. Where condition occurred: at home.Problem occurred: bending over to tie shoes  and reported as 4/10 on pain scale. General health as reported by patient is good. Pertinent medical history includes:  Overweight.  Other medical allergies details: see EPIC.  Surgeries include:  Other. Other surgery history details: L knee meniscus and vaicose veins.  Current medications:  Muscle relaxants and steroids.  Primary job tasks include:  Computer work, lifting/carrying and driving.  Pain is described as stabbing, shooting, sharp and burning and is constant. Pain is worse in the P.M. (lying down in general). Since onset symptoms are gradually improving. Patient is Reitred. Barriers include:  None as reported by patient. Red flags:  Pain at rest/night. Type of problem:  Lumbar  Condition occurred with:  Bending. This is a recurrent condition   Problem details: Pt c/o LBP since 2020.  Pain started after bending over to tie shoes.  Tried to walk it off but pain cont to worsen and next had significant difficulty getting out of bed.  MD order date 2020.  Was started on medrol dos pack that days.  Previous LBP with L L/E radiculopathy resolved with PT..  Patient reports pain:  Central lumbar spine, lower lumbar spine, lumbar spine  left, lumbar spine right, mid thoracic spine, upper lumbar spine and mid lumbar spine. Radiates to:  Gluteals right and thigh right. Associated symptoms:  Loss of motion/stiffness and numbness (lateral R foot). Symptoms are exacerbated by twisting, bending, standing, lifting and lying down and relieved by muscle relaxants, ice and heat.               Objective:  Lumbar/SI Evaluation  ROM:  Arom wnl lumbar: very guarded with all movement, ana transitional.  AROM Lumbar:   Flexion:          Mod/max loss +  Ext:                    Max loss ++   Side Bend:        Left:  Min loss +/-    Right:  Mod/max loss ++  Rotation:           Left:     Right:   Side Glide:        Left:     Right:   Strength: poor core stab recrutiement  Neural Tension/Mobility:    Right side:   SLR w/DF; Slump and SLR positive.  Lumbar Palpation:    Tenderness present at Left:    Erector Spinae  Tenderness present at Right: Erector Spinae (R>L)  Re: Airam Medrano   :   1955                   Assessment/Plan:    Patient is a 64 year old female with lumbar complaints.    Patient has the following significant findings with corresponding treatment plan.                Diagnosis 1:  LBP  Pain -  hot/cold therapy, electric stimulation, directional preference exercise and home program  Decreased ROM/flexibility - manual therapy and therapeutic exercise  Decreased strength - therapeutic exercise and therapeutic activities  Impaired gait - gait training  Impaired muscle performance - neuro re-education  Decreased function - therapeutic activities  Impaired posture - neuro re-education    Therapy Evaluation Codes:   Cumulative Therapy Evaluation is: Low complexity.    Previous and current functional limitations:  (See Goal Flow Sheet for this information)    Short term and Long term goals: (See Goal Flow Sheet for this information)     Communication ability:  Patient appears to be able to clearly communicate and understand verbal and written  communication and follow directions correctly.  Treatment Explanation - The following has been discussed with the patient:   RX ordered/plan of care  Anticipated outcomes  Possible risks and side effects  This patient would benefit from PT intervention to resume normal activities.   Rehab potential is good.    Frequency:  1 X week, once daily  Duration:  for 8 weeks  Discharge Plan:  Achieve all LTG.  Independent in home treatment program.  Reach maximal therapeutic benefit.    Thank you for your referral.    INQUIRIES  Therapist: Silas Mccarthy, [facility]  [facility]  [address] [place] [id number]  Phone: [phone]  Fax: [phone]

## 2020-01-30 NOTE — PROGRESS NOTES
[facility] Initial Evaluation  Subjective:  The history is provided by the patient. No  was used.   Airam Medrano being seen for Low back pain.   Problem began 1/22/2020. Where condition occurred: at home.Problem occurred: bending over to tie shoes  and reported as 4/10 on pain scale. General health as reported by patient is good. Pertinent medical history includes:  Overweight.   Other medical allergies details: see EPIC.  Surgeries include:  Other. Other surgery history details: L knee meniscus and vaicose veins.  Current medications:  Muscle relaxants and steroids.   Primary job tasks include:  Computer work, lifting/carrying and driving.  Pain is described as stabbing, shooting, sharp and burning and is constant. Pain is worse in the P.M. (lying down in general). Since onset symptoms are gradually improving.      Patient is Reitred.   Barriers include:  None as reported by patient.  Red flags:  Pain at rest/night.  Type of problem:  Lumbar   Condition occurred with:  Bending. This is a recurrent condition   Problem details: Pt c/o LBP since 1/22/2020.  Pain started after bending over to tie shoes.  Tried to walk it off but pain cont to worsen and next had significant difficulty getting out of bed.  MD order date 1/26/2020.  Was started on medrol dos pack that days.  Previous LBP with L L/E radiculopathy resolved with PT..   Patient reports pain:  Central lumbar spine, lower lumbar spine, lumbar spine left, lumbar spine right, mid thoracic spine, upper lumbar spine and mid lumbar spine. Radiates to:  Gluteals right and thigh right. Associated symptoms:  Loss of motion/stiffness and numbness (lateral R foot). Symptoms are exacerbated by twisting, bending, standing, lifting and lying down and relieved by muscle relaxants, ice and heat.                      Objective:  System         Lumbar/SI Evaluation  ROM:  Arom wnl lumbar: very guarded with all movement, ana  transitional.  AROM Lumbar:   Flexion:          Mod/max loss +  Ext:                    Max loss ++   Side Bend:        Left:  Min loss +/-    Right:  Mod/max loss ++  Rotation:           Left:     Right:   Side Glide:        Left:     Right:         Strength: poor core stab recrutiement          Neural Tension/Mobility:      Right side:   SLR w/DF; Slump and SLR positive.  Lumbar Palpation:    Tenderness present at Left:    Erector Spinae  Tenderness present at Right: Erector Spinae (R>L)                                                     General     ROS    Assessment/Plan:    Patient is a 64 year old female with lumbar complaints.    Patient has the following significant findings with corresponding treatment plan.                Diagnosis 1:  LBP  Pain -  hot/cold therapy, electric stimulation, directional preference exercise and home program  Decreased ROM/flexibility - manual therapy and therapeutic exercise  Decreased strength - therapeutic exercise and therapeutic activities  Impaired gait - gait training  Impaired muscle performance - neuro re-education  Decreased function - therapeutic activities  Impaired posture - neuro re-education    Therapy Evaluation Codes:   Cumulative Therapy Evaluation is: Low complexity.    Previous and current functional limitations:  (See Goal Flow Sheet for this information)    Short term and Long term goals: (See Goal Flow Sheet for this information)     Communication ability:  Patient appears to be able to clearly communicate and understand verbal and written communication and follow directions correctly.  Treatment Explanation - The following has been discussed with the patient:   RX ordered/plan of care  Anticipated outcomes  Possible risks and side effects  This patient would benefit from PT intervention to resume normal activities.   Rehab potential is good.    Frequency:  1 X week, once daily  Duration:  for 8 weeks  Discharge Plan:  Achieve all LTG.  Independent in home  treatment program.  Reach maximal therapeutic benefit.    Please refer to the daily flowsheet for treatment today, total treatment time and time spent performing 1:1 timed codes.

## 2020-02-06 ENCOUNTER — THERAPY VISIT (OUTPATIENT)
Dept: PHYSICAL THERAPY | Facility: CLINIC | Age: 65
End: 2020-02-06
Payer: COMMERCIAL

## 2020-02-06 DIAGNOSIS — M54.41 CHRONIC BILATERAL LOW BACK PAIN WITH RIGHT-SIDED SCIATICA: ICD-10-CM

## 2020-02-06 DIAGNOSIS — G89.29 CHRONIC BILATERAL LOW BACK PAIN WITH RIGHT-SIDED SCIATICA: ICD-10-CM

## 2020-02-06 PROCEDURE — 97112 NEUROMUSCULAR REEDUCATION: CPT | Mod: GP | Performed by: PHYSICAL THERAPIST

## 2020-02-06 PROCEDURE — 97110 THERAPEUTIC EXERCISES: CPT | Mod: GP | Performed by: PHYSICAL THERAPIST

## 2020-02-20 ENCOUNTER — THERAPY VISIT (OUTPATIENT)
Dept: PHYSICAL THERAPY | Facility: CLINIC | Age: 65
End: 2020-02-20
Payer: COMMERCIAL

## 2020-02-20 DIAGNOSIS — M54.41 CHRONIC BILATERAL LOW BACK PAIN WITH RIGHT-SIDED SCIATICA: ICD-10-CM

## 2020-02-20 DIAGNOSIS — G89.29 CHRONIC BILATERAL LOW BACK PAIN WITH RIGHT-SIDED SCIATICA: ICD-10-CM

## 2020-02-20 PROCEDURE — 97110 THERAPEUTIC EXERCISES: CPT | Mod: GP | Performed by: PHYSICAL THERAPIST

## 2020-02-20 PROCEDURE — 97112 NEUROMUSCULAR REEDUCATION: CPT | Mod: GP | Performed by: PHYSICAL THERAPIST

## 2020-03-15 ENCOUNTER — HEALTH MAINTENANCE LETTER (OUTPATIENT)
Age: 65
End: 2020-03-15

## 2020-04-01 ENCOUNTER — TELEPHONE (OUTPATIENT)
Dept: PHYSICAL THERAPY | Facility: CLINIC | Age: 65
End: 2020-04-01

## 2020-04-01 DIAGNOSIS — G89.29 CHRONIC BILATERAL LOW BACK PAIN WITH RIGHT-SIDED SCIATICA: ICD-10-CM

## 2020-04-01 DIAGNOSIS — M54.41 CHRONIC BILATERAL LOW BACK PAIN WITH RIGHT-SIDED SCIATICA: ICD-10-CM

## 2020-04-03 ENCOUNTER — TELEPHONE (OUTPATIENT)
Dept: PHYSICAL THERAPY | Facility: CLINIC | Age: 65
End: 2020-04-03

## 2020-04-03 DIAGNOSIS — M54.41 CHRONIC BILATERAL LOW BACK PAIN WITH RIGHT-SIDED SCIATICA: ICD-10-CM

## 2020-04-03 DIAGNOSIS — G89.29 CHRONIC BILATERAL LOW BACK PAIN WITH RIGHT-SIDED SCIATICA: ICD-10-CM

## 2020-04-03 NOTE — TELEPHONE ENCOUNTER
2nd call regarding clinic closure. HUB phone number given again to schedule telephone or video visits or to be called to schedule when clinic reopens.

## 2020-04-03 NOTE — TELEPHONE ENCOUNTER
Informed that [facility] is currently closed. Given HUB phone to schedule telephone or video visit or be called when clinic reopens. Will call again in a couple of days.

## 2020-04-08 ENCOUNTER — TELEPHONE (OUTPATIENT)
Dept: PHYSICAL THERAPY | Facility: CLINIC | Age: 65
End: 2020-04-08

## 2020-04-08 DIAGNOSIS — G89.29 CHRONIC BILATERAL LOW BACK PAIN WITH RIGHT-SIDED SCIATICA: ICD-10-CM

## 2020-04-08 DIAGNOSIS — M54.41 CHRONIC BILATERAL LOW BACK PAIN WITH RIGHT-SIDED SCIATICA: ICD-10-CM

## 2020-04-08 NOTE — TELEPHONE ENCOUNTER
3rd call regarding clinic closure. Hub phone number give to arrange virtual care or to be called when the clinic reopens.

## 2020-07-06 PROBLEM — M17.11 PRIMARY OSTEOARTHRITIS OF RIGHT KNEE: Status: ACTIVE | Noted: 2019-11-13

## 2020-07-06 RX ORDER — GABAPENTIN 300 MG/1
CAPSULE ORAL
COMMUNITY
Start: 2020-02-27 | End: 2020-07-07

## 2020-07-06 RX ORDER — VITAMIN B COMPLEX
1 CAPSULE ORAL
COMMUNITY
Start: 2014-12-04 | End: 2020-07-07

## 2020-07-06 RX ORDER — ASCORBIC ACID
CRYSTALS ORAL
COMMUNITY
End: 2020-07-07

## 2020-07-06 RX ORDER — BIOTIN 10 MG
TABLET ORAL
COMMUNITY
End: 2021-08-27

## 2020-07-06 RX ORDER — MULTIPLE VITAMINS W/ MINERALS TAB 9MG-400MCG
1 TAB ORAL EVERY 24 HOURS
COMMUNITY
Start: 2005-08-15 | End: 2020-07-07

## 2020-07-06 RX ORDER — INFLUENZA A VIRUS A/HAWAII/70/2019 (H1N1) RECOMBINANT HEMAGGLUTININ ANTIGEN, INFLUENZA A VIRUS A/MINNESOTA/41/2019 (H3N2) RECOMBINANT HEMAGGLUTININ ANTIGEN, INFLUENZA B VIRUS B/WASHINGTON/02/2019 RECOMBINANT HEMAGGLUTININ ANTIGEN, AND INFLUENZA B VIRUS B/PHUKET/3073/2013 RECOMBINANT HEMAGGLUTININ ANTIGEN 45; 45; 45; 45 UG/.5ML; UG/.5ML; UG/.5ML; UG/.5ML
INJECTION INTRAMUSCULAR
COMMUNITY
Start: 2019-10-10

## 2020-07-06 RX ORDER — DIAZEPAM 5 MG
TABLET ORAL
COMMUNITY
Start: 2020-02-03 | End: 2020-07-07

## 2020-07-06 RX ORDER — MULTIVITAMIN WITH IRON
1 TABLET ORAL
COMMUNITY

## 2020-07-06 RX ORDER — METHYLPREDNISOLONE 4 MG
TABLET, DOSE PACK ORAL
COMMUNITY
Start: 2020-02-27 | End: 2020-07-07

## 2020-07-06 RX ORDER — METHYLPREDNISOLONE 4 MG
4 TABLET, DOSE PACK ORAL EVERY 24 HOURS
COMMUNITY
Start: 2020-01-26 | End: 2020-07-07

## 2020-07-06 RX ORDER — ASPIRIN 81 MG/1
TABLET, CHEWABLE ORAL
COMMUNITY

## 2020-07-06 RX ORDER — LORAZEPAM 0.5 MG
2000 TABLET ORAL
COMMUNITY
Start: 2014-12-04 | End: 2021-08-27

## 2020-07-06 NOTE — PROGRESS NOTES
Pre-Visit Planning     Future Appointments   Date Time Provider Department Center   7/7/2020  1:00 PM Haase, Susan Rachele, APRN CNP [facility]     Arrival Time for this Appointment: 12:35 PM   Appointment Notes for this encounter:   ramón chan.  Physical w/ pap    Questionnaires Reviewed/Assigned  No additional questionnaires are needed      Patient preferred phone number: [phone]    Unable to reach. Left voicemail. Advised patient to call clinic back at [phone].

## 2020-07-07 ENCOUNTER — OFFICE VISIT (OUTPATIENT)
Dept: FAMILY MEDICINE | Facility: CLINIC | Age: 65
End: 2020-07-07
Payer: COMMERCIAL

## 2020-07-07 ENCOUNTER — TELEPHONE (OUTPATIENT)
Dept: FAMILY MEDICINE | Facility: CLINIC | Age: 65
End: 2020-07-07

## 2020-07-07 VITALS
RESPIRATION RATE: 16 BRPM | WEIGHT: 225.2 LBS | SYSTOLIC BLOOD PRESSURE: 133 MMHG | TEMPERATURE: 98.1 F | HEART RATE: 59 BPM | BODY MASS INDEX: 36.19 KG/M2 | HEIGHT: 66 IN | OXYGEN SATURATION: 96 % | DIASTOLIC BLOOD PRESSURE: 83 MMHG

## 2020-07-07 DIAGNOSIS — Z12.4 SCREENING FOR MALIGNANT NEOPLASM OF CERVIX: ICD-10-CM

## 2020-07-07 DIAGNOSIS — G89.29 CHRONIC BILATERAL LOW BACK PAIN WITH RIGHT-SIDED SCIATICA: ICD-10-CM

## 2020-07-07 DIAGNOSIS — M54.41 CHRONIC BILATERAL LOW BACK PAIN WITH RIGHT-SIDED SCIATICA: ICD-10-CM

## 2020-07-07 DIAGNOSIS — Z00.00 ROUTINE GENERAL MEDICAL EXAMINATION AT A HEALTH CARE FACILITY: Primary | ICD-10-CM

## 2020-07-07 DIAGNOSIS — D12.6 ADENOMATOUS POLYP OF COLON, UNSPECIFIED PART OF COLON: ICD-10-CM

## 2020-07-07 PROCEDURE — 99396 PREV VISIT EST AGE 40-64: CPT | Performed by: NURSE PRACTITIONER

## 2020-07-07 PROCEDURE — 87624 HPV HI-RISK TYP POOLED RSLT: CPT | Performed by: NURSE PRACTITIONER

## 2020-07-07 PROCEDURE — G0145 SCR C/V CYTO,THINLAYER,RESCR: HCPCS | Performed by: NURSE PRACTITIONER

## 2020-07-07 SDOH — ECONOMIC STABILITY: TRANSPORTATION INSECURITY
IN THE PAST 12 MONTHS, HAS LACK OF TRANSPORTATION KEPT YOU FROM MEETINGS, WORK, OR FROM GETTING THINGS NEEDED FOR DAILY LIVING?: NO

## 2020-07-07 SDOH — HEALTH STABILITY: MENTAL HEALTH: HOW OFTEN DO YOU HAVE 6 OR MORE DRINKS ON ONE OCCASION?: NEVER

## 2020-07-07 SDOH — SOCIAL STABILITY: SOCIAL NETWORK
IN A TYPICAL WEEK, HOW MANY TIMES DO YOU TALK ON THE PHONE WITH FAMILY, FRIENDS, OR NEIGHBORS?: MORE THAN THREE TIMES A WEEK

## 2020-07-07 SDOH — HEALTH STABILITY: MENTAL HEALTH: HOW OFTEN DO YOU HAVE A DRINK CONTAINING ALCOHOL?: 2-4 TIMES A MONTH

## 2020-07-07 SDOH — ECONOMIC STABILITY: INCOME INSECURITY: IN THE LAST 12 MONTHS, WAS THERE A TIME WHEN YOU WERE NOT ABLE TO PAY THE MORTGAGE OR RENT ON TIME?: NO

## 2020-07-07 SDOH — SOCIAL STABILITY: SOCIAL NETWORK: ARE YOU MARRIED, WIDOWED, DIVORCED, SEPARATED, NEVER MARRIED, OR LIVING WITH A PARTNER?: MARRIED

## 2020-07-07 SDOH — HEALTH STABILITY: PHYSICAL HEALTH: ON AVERAGE, HOW MANY MINUTES DO YOU ENGAGE IN EXERCISE AT THIS LEVEL?: 60 MIN

## 2020-07-07 SDOH — ECONOMIC STABILITY: FOOD INSECURITY: WITHIN THE PAST 12 MONTHS, THE FOOD YOU BOUGHT JUST DIDN'T LAST AND YOU DIDN'T HAVE MONEY TO GET MORE.: NEVER TRUE

## 2020-07-07 SDOH — HEALTH STABILITY: PHYSICAL HEALTH: ON AVERAGE, HOW MANY DAYS PER WEEK DO YOU ENGAGE IN MODERATE TO STRENUOUS EXERCISE (LIKE A BRISK WALK)?: 2 DAYS

## 2020-07-07 SDOH — ECONOMIC STABILITY: TRANSPORTATION INSECURITY
IN THE PAST 12 MONTHS, HAS THE LACK OF TRANSPORTATION KEPT YOU FROM MEDICAL APPOINTMENTS OR FROM GETTING MEDICATIONS?: NO

## 2020-07-07 SDOH — SOCIAL STABILITY: SOCIAL NETWORK: HOW OFTEN DO YOU GET TOGETHER WITH FRIENDS OR RELATIVES?: MORE THAN THREE TIMES A WEEK

## 2020-07-07 SDOH — ECONOMIC STABILITY: FOOD INSECURITY: WITHIN THE PAST 12 MONTHS, YOU WORRIED THAT YOUR FOOD WOULD RUN OUT BEFORE YOU GOT MONEY TO BUY MORE.: NEVER TRUE

## 2020-07-07 SDOH — ECONOMIC STABILITY: INCOME INSECURITY: HOW HARD IS IT FOR YOU TO PAY FOR THE VERY BASICS LIKE FOOD, HOUSING, MEDICAL CARE, AND HEATING?: NOT HARD AT ALL

## 2020-07-07 SDOH — HEALTH STABILITY: MENTAL HEALTH
STRESS IS WHEN SOMEONE FEELS TENSE, NERVOUS, ANXIOUS, OR CAN'T SLEEP AT NIGHT BECAUSE THEIR MIND IS TROUBLED. HOW STRESSED ARE YOU?: NOT AT ALL

## 2020-07-07 SDOH — SOCIAL STABILITY: SOCIAL NETWORK: HOW OFTEN DO YOU ATTENT MEETINGS OF THE CLUB OR ORGANIZATION YOU BELONG TO?: MORE THAN 4 TIMES PER YEAR

## 2020-07-07 SDOH — HEALTH STABILITY: MENTAL HEALTH: HOW MANY STANDARD DRINKS CONTAINING ALCOHOL DO YOU HAVE ON A TYPICAL DAY?: 1 OR 2

## 2020-07-07 SDOH — SOCIAL STABILITY: SOCIAL NETWORK: HOW OFTEN DO YOU ATTEND CHURCH OR RELIGIOUS SERVICES?: MORE THAN 4 TIMES PER YEAR

## 2020-07-07 SDOH — SOCIAL STABILITY: SOCIAL NETWORK
DO YOU BELONG TO ANY CLUBS OR ORGANIZATIONS SUCH AS CHURCH GROUPS UNIONS, FRATERNAL OR ATHLETIC GROUPS, OR SCHOOL GROUPS?: YES

## 2020-07-07 ASSESSMENT — ENCOUNTER SYMPTOMS
ARTHRALGIAS: 0
WEAKNESS: 1
NERVOUS/ANXIOUS: 0
SHORTNESS OF BREATH: 0
JOINT SWELLING: 0
DIZZINESS: 0
EYE PAIN: 0
CONSTIPATION: 0
CHILLS: 0
COUGH: 0
DYSURIA: 0
MYALGIAS: 1
NAUSEA: 0
HEARTBURN: 0
FEVER: 0
FREQUENCY: 0
PALPITATIONS: 0
DIARRHEA: 0
HEMATURIA: 0
HEADACHES: 0
BREAST MASS: 0
ABDOMINAL PAIN: 0
SORE THROAT: 0
HEMATOCHEZIA: 0
PARESTHESIAS: 0

## 2020-07-07 ASSESSMENT — MIFFLIN-ST. JEOR: SCORE: 1593.25

## 2020-07-07 ASSESSMENT — PATIENT HEALTH QUESTIONNAIRE - PHQ9
SUM OF ALL RESPONSES TO PHQ QUESTIONS 1-9: 3
SUM OF ALL RESPONSES TO PHQ QUESTIONS 1-9: 3
10. IF YOU CHECKED OFF ANY PROBLEMS, HOW DIFFICULT HAVE THESE PROBLEMS MADE IT FOR YOU TO DO YOUR WORK, TAKE CARE OF THINGS AT HOME, OR GET ALONG WITH OTHER PEOPLE: NOT DIFFICULT AT ALL

## 2020-07-07 ASSESSMENT — ANXIETY QUESTIONNAIRES
GAD7 TOTAL SCORE: 1
7. FEELING AFRAID AS IF SOMETHING AWFUL MIGHT HAPPEN: SEVERAL DAYS
GAD7 TOTAL SCORE: 1
GAD7 TOTAL SCORE: 1
7. FEELING AFRAID AS IF SOMETHING AWFUL MIGHT HAPPEN: SEVERAL DAYS
1. FEELING NERVOUS, ANXIOUS, OR ON EDGE: NOT AT ALL
5. BEING SO RESTLESS THAT IT IS HARD TO SIT STILL: NOT AT ALL
4. TROUBLE RELAXING: NOT AT ALL
6. BECOMING EASILY ANNOYED OR IRRITABLE: NOT AT ALL
2. NOT BEING ABLE TO STOP OR CONTROL WORRYING: NOT AT ALL
3. WORRYING TOO MUCH ABOUT DIFFERENT THINGS: NOT AT ALL

## 2020-07-07 NOTE — PROGRESS NOTES
SUBJECTIVE:   CC: Airam Medrano is an 64 year old woman who presents for preventive health visit.     Healthy Habits:     Getting at least 3 servings of Calcium per day:  Yes    Bi-annual eye exam:  Yes    Dental care twice a year:  Yes    Sleep apnea or symptoms of sleep apnea:  None    Diet:  Regular (no restrictions)    Frequency of exercise:  2-3 days/week    Duration of exercise:  15-30 minutes    Taking medications regularly:  Yes    Medication side effects:  Not applicable    PHQ-2 Total Score: 0    Additional concerns today:  Yes    Cervical cancer screening:  Last Pap:  7/2016, NIL  Breast cancer screening: last  Mammogram 12/2018  Colonoscopy:  Last completed in 7/2018, polyp noted, next was due in 7/2019.    Lower back pain:  Followed by orthopedics, has been having difficulty with right lower back pain with radiculopathy since 1/2020, being followed at [facility], has had gradual improvement in pain with injection and several medrol dose packets.  She has currently resumed riding her bike and regaining stamina.  Taking ibuprofen 400 mg bid and tylenol 650 mg bid for pain control.  Currently rates pain 3/10.   Depression:  PHQ 9 of 3.      Today's PHQ-2 Score:   PHQ-2 ( 1999 Pfizer) 7/7/2020   Q1: Little interest or pleasure in doing things -   Q2: Feeling down, depressed or hopeless -   PHQ-2 Score -   Q1: Little interest or pleasure in doing things -   Q2: Feeling down, depressed or hopeless -   PHQ-2 Score Incomplete     Abuse: Current or Past(Physical, Sexual or Emotional)- No  Do you feel safe in your environment? Yes    Social History     Tobacco Use     Smoking status: Never Smoker     Smokeless tobacco: Never Used   Substance Use Topics     Alcohol use: Yes     Comment: 1-2 drinks per week       Alcohol Use 5/23/2018   Prescreen: >3 drinks/day or >7 drinks/week? No   Prescreen: >3 drinks/day or >7 drinks/week? -     Reviewed orders with patient.  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)     CARDIOVASCULAR SCREENING; LDL GOAL LESS THAN 160     Advance Care Planning     Right-sided low back pain with right-sided sciatica, unspecified chronicity     Right hip pain     Chronic bilateral low back pain with right-sided sciatica     Factor V Leiden (H)     Primary osteoarthritis of right knee     Venous (peripheral) insufficiency     Past Surgical History:   Procedure Laterality Date     C NONSPECIFIC PROCEDURE  1989-90    varicose vein stripping     ORTHOPEDIC SURGERY      meniscus repair of left knee     VASCULAR SURGERY      veinectomy x2       Social History     Tobacco Use     Smoking status: Never Smoker     Smokeless tobacco: Never Used   Substance Use Topics     Alcohol use: Yes     Frequency: 2-4 times a month     Drinks per session: 1 or 2     Binge frequency: Never     Comment: 1-2 drinks per week     Family History   Problem Relation Age of Onset     Cerebrovascular Disease Mother      Arthritis Father      Genitourinary Problems Maternal Grandmother      Heart Disease Maternal Grandfather      Cancer Paternal Grandmother      Cancer Paternal Grandfather          Current Outpatient Medications   Medication Sig Dispense Refill     cholecalciferol ( ULTRA STRENGTH) 50 MCG (2000 UT) CAPS Take 2,000 Units by mouth       aspirin (ASA) 81 MG chewable tablet        Biotin 10 MG TABS tablet        FLUBLOK QUADRIVALENT 0.5 ML injection        ibuprofen (MOTRIN) 600 MG tablet Take 600 mg by mouth every 6 hours as needed. 0040-2461 mg daily       vitamin (B COMPLEX-C) tablet Take 1 tablet by mouth    "      Mammogram Screening: Patient over age 50, mutual decision to screen reflected in health maintenance.    Pertinent mammograms are reviewed under the imaging tab.  History of abnormal Pap smear: NO - age 30- 65 PAP every 3 years recommended     Reviewed and updated as needed this visit by clinical staff  Tobacco  Allergies  Med Hx  Surg Hx  Fam Hx  Soc Hx        Reviewed and updated as needed this visit by Provider            Review of Systems   Constitutional: Negative for chills and fever.   HENT: Negative for congestion, ear pain, hearing loss and sore throat.    Eyes: Negative for pain and visual disturbance.   Respiratory: Negative for cough and shortness of breath.    Cardiovascular: Positive for peripheral edema. Negative for chest pain and palpitations.   Gastrointestinal: Negative for abdominal pain, constipation, diarrhea, heartburn, hematochezia and nausea.   Breasts:  Negative for tenderness, breast mass and discharge.   Genitourinary: Negative for dysuria, frequency, genital sores, hematuria, pelvic pain, urgency, vaginal bleeding and vaginal discharge.   Musculoskeletal: Positive for myalgias. Negative for arthralgias and joint swelling.   Skin: Negative for rash.   Neurological: Positive for weakness. Negative for dizziness, headaches and paresthesias.   Psychiatric/Behavioral: Negative for mood changes. The patient is not nervous/anxious.       OBJECTIVE:   /83   Pulse 59   Temp 98.1  F (36.7  C) (Oral)   Resp 16   Ht 1.676 m (5' 6\")   Wt 102.2 kg (225 lb 3.2 oz)   SpO2 96%   BMI 36.35 kg/m    Physical Exam  GENERAL: healthy, alert and no distress  EYES: Eyes grossly normal to inspection,   HENT: ear canals and TM's normal, nose and mouth without ulcers or lesions  NECK: no adenopathy, no asymmetry, masses, or scars and thyroid normal to palpation  RESP: lungs clear to auscultation - no rales, rhonchi or wheezes  BREAST: normal without masses, tenderness or nipple discharge and " no palpable axillary masses or adenopathy  CV: regular rate and rhythm, normal S1 S2, no S3 or S4, no murmur, click or rub, no peripheral edema and peripheral pulses strong  ABDOMEN: soft, nontender, no hepatosplenomegaly, no masses and bowel sounds normal   (female):  Normal female external genitalia, normal urethral meatus, vaginal mucosa pale pink, dry with decreased rugation, normal cervix/adnexa/uterus without masses or discharge  MS: right lower back tenderness upon palpation, gross musculoskeletal defects noted, no edema  SKIN: numerous nevi, no suspicious lesions or rashes  NEURO: Normal strength and tone, mentation intact and speech normal  PSYCH: mentation appears normal, affect normal/bright      ASSESSMENT/PLAN:   Ariam was seen today for physical.    Diagnoses and all orders for this visit:    Routine general medical examination at a health care facility:  Will return for fasting labs.   -    -     REVIEW OF HEALTH MAINTENANCE PROTOCOL ORDERS  -     CBC with platelets differential; Future  -     Comprehensive metabolic panel; Future  -     Lipid panel reflex to direct LDL Fasting; Future  -     TSH with free T4 reflex; Future    Screening for malignant neoplasm of cervix   Pap imaged thin layer screen with HPV - recommended age 30 - 65  -     HPV High Risk Types DNA Cervical    Chronic bilateral low back pain with right-sided sciatica:  Followed by [facility], taking ibuprofen and  Tylenol on a daily basis, pain is improving, gradually increasing activity level.  After labs are back will further discuss another NSAID option for pain longer term.      Colon cancer screening: is due for colonoscopy, will schedule with [facility].       COUNSELING:  Reviewed preventive health counseling, as reflected in patient instructions       Regular exercise       Healthy diet/nutrition       Colon cancer screening    Estimated body mass index is 30.67 kg/m  as calculated from the following:    Height as of 5/23/18: 1.676 m  "(5' 6\").    Weight as of 5/23/18: 86.2 kg (190 lb).    Weight management plan: Discussed healthy diet and exercise guidelines     reports that she has never smoked. She has never used smokeless tobacco.      Counseling Resources:  ATP IV Guidelines  Pooled Cohorts Equation Calculator  Breast Cancer Risk Calculator  FRAX Risk Assessment  ICSI Preventive Guidelines  Dietary Guidelines for Americans, 2010  USDA's MyPlate  ASA Prophylaxis  Lung CA Screening  Follow up in 1 year for preventive exam, sooner as needed.   Susan Haase, APRN [facility]  Answers for HPI/ROS submitted by the patient on 7/7/2020   Annual Exam:  If you checked off any problems, how difficult have these problems made it for you to do your work, take care of things at home, or get along with other people?: Not difficult at all  PHQ9 TOTAL SCORE: 3  WILIAN 7 TOTAL SCORE: 1    "

## 2020-07-07 NOTE — TELEPHONE ENCOUNTER
Pt calling right after finishing her appointment with Susan Haase today, she forgot to ask if Nina thought it would be ok for her to see a chiropractor for her back pain.    Pls call pt at [phone] OK to [facility] with details        Luis Castanon , [facility]  07/07/20 2:40 PM

## 2020-07-08 ASSESSMENT — ANXIETY QUESTIONNAIRES: GAD7 TOTAL SCORE: 1

## 2020-07-08 ASSESSMENT — PATIENT HEALTH QUESTIONNAIRE - PHQ9: SUM OF ALL RESPONSES TO PHQ QUESTIONS 1-9: 3

## 2020-07-08 NOTE — TELEPHONE ENCOUNTER
Please let Airam know that I would like her to ask her back specialist this question.  Thanks,  Susan Haase, CNP

## 2020-07-08 NOTE — TELEPHONE ENCOUNTER
Spoke to patient, relayed below message. Patient will reach out to her back specialist.    Pat Colin,

## 2020-07-09 LAB
COPATH REPORT: NORMAL
PAP: NORMAL

## 2020-07-14 DIAGNOSIS — Z00.00 ROUTINE GENERAL MEDICAL EXAMINATION AT A HEALTH CARE FACILITY: ICD-10-CM

## 2020-07-14 LAB
ALBUMIN SERPL-MCNC: 3.8 G/DL (ref 3.4–5)
ALP SERPL-CCNC: 73 U/L (ref 40–150)
ALT SERPL W P-5'-P-CCNC: 24 U/L (ref 0–50)
ANION GAP SERPL CALCULATED.3IONS-SCNC: 6 MMOL/L (ref 3–14)
AST SERPL W P-5'-P-CCNC: 17 U/L (ref 0–45)
BASOPHILS # BLD AUTO: 0.1 10E9/L (ref 0–0.2)
BASOPHILS NFR BLD AUTO: 1.6 %
BILIRUB SERPL-MCNC: 0.5 MG/DL (ref 0.2–1.3)
BUN SERPL-MCNC: 15 MG/DL (ref 7–30)
CALCIUM SERPL-MCNC: 9.1 MG/DL (ref 8.5–10.1)
CHLORIDE SERPL-SCNC: 106 MMOL/L (ref 94–109)
CHOLEST SERPL-MCNC: 198 MG/DL
CO2 SERPL-SCNC: 26 MMOL/L (ref 20–32)
CREAT SERPL-MCNC: 0.82 MG/DL (ref 0.52–1.04)
DIFFERENTIAL METHOD BLD: NORMAL
EOSINOPHIL # BLD AUTO: 0.4 10E9/L (ref 0–0.7)
EOSINOPHIL NFR BLD AUTO: 6.2 %
ERYTHROCYTE [DISTWIDTH] IN BLOOD BY AUTOMATED COUNT: 12.1 % (ref 10–15)
FINAL DIAGNOSIS: NORMAL
GFR SERPL CREATININE-BSD FRML MDRD: 75 ML/MIN/{1.73_M2}
GLUCOSE SERPL-MCNC: 102 MG/DL (ref 70–99)
HCT VFR BLD AUTO: 42 % (ref 35–47)
HDLC SERPL-MCNC: 65 MG/DL
HGB BLD-MCNC: 14 G/DL (ref 11.7–15.7)
HPV HR 12 DNA CVX QL NAA+PROBE: NEGATIVE
HPV16 DNA SPEC QL NAA+PROBE: NEGATIVE
HPV18 DNA SPEC QL NAA+PROBE: NEGATIVE
LDLC SERPL CALC-MCNC: 116 MG/DL
LYMPHOCYTES # BLD AUTO: 1.9 10E9/L (ref 0.8–5.3)
LYMPHOCYTES NFR BLD AUTO: 33.9 %
MCH RBC QN AUTO: 31.8 PG (ref 26.5–33)
MCHC RBC AUTO-ENTMCNC: 33.3 G/DL (ref 31.5–36.5)
MCV RBC AUTO: 96 FL (ref 78–100)
MONOCYTES # BLD AUTO: 0.5 10E9/L (ref 0–1.3)
MONOCYTES NFR BLD AUTO: 8.6 %
NEUTROPHILS # BLD AUTO: 2.8 10E9/L (ref 1.6–8.3)
NEUTROPHILS NFR BLD AUTO: 49.7 %
NONHDLC SERPL-MCNC: 133 MG/DL
PLATELET # BLD AUTO: 227 10E9/L (ref 150–450)
POTASSIUM SERPL-SCNC: 4.2 MMOL/L (ref 3.4–5.3)
PROT SERPL-MCNC: 7.3 G/DL (ref 6.8–8.8)
RBC # BLD AUTO: 4.4 10E12/L (ref 3.8–5.2)
SODIUM SERPL-SCNC: 138 MMOL/L (ref 133–144)
SPECIMEN DESCRIPTION: NORMAL
SPECIMEN SOURCE CVX/VAG CYTO: NORMAL
TRIGL SERPL-MCNC: 83 MG/DL
TSH SERPL DL<=0.005 MIU/L-ACNC: 3.97 MU/L (ref 0.4–4)
WBC # BLD AUTO: 5.6 10E9/L (ref 4–11)

## 2020-07-14 PROCEDURE — 80050 GENERAL HEALTH PANEL: CPT | Performed by: NURSE PRACTITIONER

## 2020-07-14 PROCEDURE — 80061 LIPID PANEL: CPT | Performed by: NURSE PRACTITIONER

## 2020-07-14 PROCEDURE — 36415 COLL VENOUS BLD VENIPUNCTURE: CPT | Performed by: NURSE PRACTITIONER

## 2020-07-14 NOTE — RESULT ENCOUNTER NOTE
Carl Alegria,  Your CBC (checks for anemia and infection) is normal.  Sincerely,     Susan Haase, CNP

## 2020-07-24 NOTE — RESULT ENCOUNTER NOTE
Carl Alegria,  Your lab results are as below:  1)  TSH (thyroid level) 3.97 which is normal (range 0.4-4)  2)  Cholesterol is normal at 198, your LDL (bad cholesterol) and your HDL (good cholesterol) were within normal range. Continue to follow a low cholesterol diet and we will recheck this in 1 year.  3)  Glucose is slightly elevated at 102 (normal fasting is <100).      If you have any questions do not hesitate to call the clinic to discuss the results with me further.     Sincerely,    Susan Haase, CNP

## 2020-08-11 NOTE — PROGRESS NOTES
Discharge Note    Progress reporting period is from initial eval to Feb 20, 2020.     Airam failed to return for next follow up visit and current status is unknown.  Please see information below for last relevant information on current status.  Patient seen for Rxs Used: 3 visits.  SUBJECTIVE  Subjective changes noted by patient:  Subjective: Saw MD and had injection 2/14/2020.  Improvement noted.  Able to sleep in own bed past few nights  .  Current pain level is  .     Previous pain level was  Initial Pain level: 4/10.   Changes in function:  Yes (See Goal flowsheet attached for changes in current functional level)  Adverse reaction to treatment or activity: None    OBJECTIVE  Changes noted in objective findings: Objective: Lx ext mod loss +, SLS L 20-25 sec, R 10-15 sec     ASSESSMENT/PLAN  Diagnosis: LBP   DIAGP:  The encounter diagnosis was Chronic bilateral low back pain with right-sided sciatica.  Updated problem list and treatment plan:   Pain - HEP  Decreased ROM/flexibility - HEP  Decreased function - HEP  Decreased strength - HEP  Impaired muscle performance - HEP  Impaired posture - HEP  STG/LTGs have been met or progress has been made towards goals:  Yes, please see goal flowsheet for most current information  Assessment of Progress: current status is unknown.  Last current status: Assessment of progress: Pt is progressing as expected   Self Management Plans:  HEP  I have re-evaluated this patient and find that the nature, scope, duration and intensity of the therapy is appropriate for the medical condition of the patient.  Airam continues to require the following intervention to meet STG and LTG's:  HEP.    Recommendations:  Discharge with current home program.  Patient to follow up with MD as needed.    Please refer to the daily flowsheet for treatment today, total treatment time and time spent performing 1:1 timed codes.

## 2020-10-27 ENCOUNTER — TELEPHONE (OUTPATIENT)
Dept: FAMILY MEDICINE | Facility: CLINIC | Age: 65
End: 2020-10-27

## 2020-10-27 NOTE — TELEPHONE ENCOUNTER
Contacted patient and let her know that she can get the shingrix vaccine. But to check with her insurance company because in the office said [phone]

## 2020-10-27 NOTE — TELEPHONE ENCOUNTER
Please call Airam and let her know that she can get a Shingrix vaccine, let her know that this is a new series that includes 2 injections, the second injection is given 2 months after the first. Let her know about clinic vs pharmacy options.  Thanks,  Susan Haase, CNP

## 2020-11-11 ENCOUNTER — ALLIED HEALTH/NURSE VISIT (OUTPATIENT)
Dept: FAMILY MEDICINE | Facility: CLINIC | Age: 65
End: 2020-11-11
Payer: COMMERCIAL

## 2020-11-11 DIAGNOSIS — Z23 NEED FOR SHINGLES VACCINE: ICD-10-CM

## 2020-11-11 DIAGNOSIS — Z23 NEED FOR PROPHYLACTIC VACCINATION AND INOCULATION AGAINST INFLUENZA: Primary | ICD-10-CM

## 2020-11-11 PROCEDURE — 99207 PR NO CHARGE NURSE ONLY: CPT

## 2020-11-11 PROCEDURE — 90682 RIV4 VACC RECOMBINANT DNA IM: CPT

## 2020-11-11 PROCEDURE — 90750 HZV VACC RECOMBINANT IM: CPT

## 2020-12-15 ENCOUNTER — ALLIED HEALTH/NURSE VISIT (OUTPATIENT)
Dept: FAMILY MEDICINE | Facility: CLINIC | Age: 65
End: 2020-12-15
Payer: COMMERCIAL

## 2020-12-15 DIAGNOSIS — Z23 NEED FOR VACCINATION: Primary | ICD-10-CM

## 2020-12-15 PROCEDURE — 99207 PR NO CHARGE NURSE ONLY: CPT

## 2020-12-15 PROCEDURE — 90471 IMMUNIZATION ADMIN: CPT

## 2020-12-15 PROCEDURE — 90750 HZV VACC RECOMBINANT IM: CPT

## 2020-12-16 ENCOUNTER — TELEPHONE (OUTPATIENT)
Dept: FAMILY MEDICINE | Facility: CLINIC | Age: 65
End: 2020-12-16

## 2020-12-16 NOTE — TELEPHONE ENCOUNTER
Patient calling in stating she receives her 2nd Shingles Vaccine yesterday.   Noted to have the chills last night and soreness and body aches this morning.     Advised that these are two common side effects and should subside in the next 2-3 days.     Mychart sent with information as well.     Patient expressed understanding and acceptance of the plan and had no further questions at this time. Advised to call back if worsening symptoms or no improvement noted.         Bing Patel RN Flex

## 2021-03-13 ENCOUNTER — MYC MEDICAL ADVICE (OUTPATIENT)
Dept: FAMILY MEDICINE | Facility: CLINIC | Age: 66
End: 2021-03-13

## 2021-03-14 ENCOUNTER — HEALTH MAINTENANCE LETTER (OUTPATIENT)
Age: 66
End: 2021-03-14

## 2021-06-25 ENCOUNTER — TELEPHONE (OUTPATIENT)
Dept: FAMILY MEDICINE | Facility: CLINIC | Age: 66
End: 2021-06-25

## 2021-06-25 NOTE — TELEPHONE ENCOUNTER
Patient Quality Outreach      Summary:    Patient has the following on her problem list/HM: None    Patient is due/failing the following:   Breast Cancer Screening - Mammogram    Type of outreach:    Phone, left message for patient/parent to call back.    Questions for provider review:    None                                                                                                                                     Susana Loya CMA       Chart routed to Care Team.

## 2021-06-28 NOTE — TELEPHONE ENCOUNTER
Please return call to patient -   she was transferred incorrectly to RN PAL line and message left on voicemail that she was returning call     Thank you,   Arlyn Mann, Registered Nurse, PAL (Patient Advocate Liason)   [facility]   [phone]

## 2021-06-28 NOTE — TELEPHONE ENCOUNTER
Msg left on Pt's cell phone attempting to schedule mammogram.  Clinic number left for call back.     Please assist with scheduling.       Brigette Cosby  A-EMT  Clinic Health Guide, SB 4 PAL

## 2021-07-14 ENCOUNTER — TELEPHONE (OUTPATIENT)
Dept: FAMILY MEDICINE | Facility: CLINIC | Age: 66
End: 2021-07-14

## 2021-07-14 NOTE — TELEPHONE ENCOUNTER
Patient Quality Outreach      Summary:    Patient has the following on her problem list/HM: None    Patient is due/failing the following:   Breast Cancer Screening - Mammogram    Type of outreach:    Phone, left message for patient/parent to call back.    Questions for provider review:    None                                                                                                                                     Natalia Burger MA  [facility].         Chart routed to Self.

## 2021-07-14 NOTE — LETTER
[facility]  [address] [address] [phone]  July 21, 2021    Airam Medrano  [address] [address]    Dear Airam,    I care about your health and have reviewed your health plan. I have reviewed your medical conditions, medication list, and lab results and am making recommendations based on this review, to better manage your health.    You are in particular need of attention regarding:  -Breast Cancer Screening    I am recommending that you:  {recommendations:-schedule a MAMMOGRAM which is due. We have mammogram available at our clinic; please call [phone].   Please disregard this reminder if you have had this exam elsewhere within the last year.  It would be helpful for us to have a copy of your mammogram report in our file so that we can best coordinate your care.    Here is a list of Health Maintenance topics that are due now or due soon:  Health Maintenance Due   Topic Date Due     HIV SCREENING  Never done     MAMMO SCREENING  12/04/2020     PHQ-2  01/01/2021     ADVANCE CARE PLANNING  01/13/2021     DTAP/TDAP/TD IMMUNIZATION (2 - Td or Tdap) 02/17/2021     ANNUAL REVIEW OF HM ORDERS  07/07/2021     PREVENTIVE CARE VISIT  07/07/2021       Please call us at [phone] (or use [employer]) to address the above recommendations.     Thank you for trusting [facility] and we appreciate the opportunity to serve you.  We look forward to supporting your healthcare needs in the future.    Healthy Regards,    Susan Haase CNP/rd

## 2021-07-21 NOTE — TELEPHONE ENCOUNTER
Patient Quality Outreach 2nd Attempt      Summary:    Type of outreach:    Phone, left message for patient/parent to call back. and Sent letter.    Next Steps:  Reach out within 90 days via Phone.    Max number of attempts reached: Yes. Will try again in 90 days if patient still on fail list.    Questions for provider review:    None                                                                                                                    Natalia Burger MA  [facility].         Chart routed to none.

## 2021-08-04 ENCOUNTER — TRANSFERRED RECORDS (OUTPATIENT)
Dept: HEALTH INFORMATION MANAGEMENT | Facility: CLINIC | Age: 66
End: 2021-08-04

## 2021-08-24 ASSESSMENT — ENCOUNTER SYMPTOMS
SORE THROAT: 0
CHILLS: 0
COUGH: 0
NAUSEA: 0
HEMATOCHEZIA: 0
HEMATURIA: 0
FEVER: 0
EYE PAIN: 0
DIZZINESS: 0
WEAKNESS: 1
NERVOUS/ANXIOUS: 0
PALPITATIONS: 0
FREQUENCY: 0
BREAST MASS: 0
ARTHRALGIAS: 1
MYALGIAS: 1
PARESTHESIAS: 0
DYSURIA: 0
ABDOMINAL PAIN: 0
SHORTNESS OF BREATH: 0
JOINT SWELLING: 0
CONSTIPATION: 0
DIARRHEA: 0
HEARTBURN: 0
HEADACHES: 0

## 2021-08-24 ASSESSMENT — ACTIVITIES OF DAILY LIVING (ADL): CURRENT_FUNCTION: NO ASSISTANCE NEEDED

## 2021-08-27 ENCOUNTER — OFFICE VISIT (OUTPATIENT)
Dept: FAMILY MEDICINE | Facility: CLINIC | Age: 66
End: 2021-08-27
Payer: COMMERCIAL

## 2021-08-27 VITALS
HEIGHT: 66 IN | HEART RATE: 60 BPM | DIASTOLIC BLOOD PRESSURE: 78 MMHG | SYSTOLIC BLOOD PRESSURE: 122 MMHG | BODY MASS INDEX: 36.32 KG/M2 | RESPIRATION RATE: 12 BRPM | WEIGHT: 226 LBS | TEMPERATURE: 98.2 F | OXYGEN SATURATION: 95 %

## 2021-08-27 DIAGNOSIS — D68.51 FACTOR V LEIDEN (H): ICD-10-CM

## 2021-08-27 DIAGNOSIS — Z00.00 WELCOME TO MEDICARE PREVENTIVE VISIT: Primary | ICD-10-CM

## 2021-08-27 DIAGNOSIS — R94.6 ABNORMAL RESULTS OF THYROID FUNCTION STUDIES: ICD-10-CM

## 2021-08-27 DIAGNOSIS — R21 RASH: ICD-10-CM

## 2021-08-27 DIAGNOSIS — Z11.4 SCREENING FOR HIV (HUMAN IMMUNODEFICIENCY VIRUS): ICD-10-CM

## 2021-08-27 DIAGNOSIS — E72.10 DISORDER OF SULFUR-BEARING AMINO ACID METABOLISM (H): ICD-10-CM

## 2021-08-27 DIAGNOSIS — R79.89 ABNORMAL TSH: ICD-10-CM

## 2021-08-27 DIAGNOSIS — R79.89 ELEVATED TSH: ICD-10-CM

## 2021-08-27 DIAGNOSIS — Z78.0 MENOPAUSE PRESENT: ICD-10-CM

## 2021-08-27 DIAGNOSIS — E66.01 MORBID OBESITY (H): ICD-10-CM

## 2021-08-27 LAB
ALBUMIN SERPL-MCNC: 3.6 G/DL (ref 3.4–5)
ALP SERPL-CCNC: 61 U/L (ref 40–150)
ALT SERPL W P-5'-P-CCNC: 24 U/L (ref 0–50)
ANION GAP SERPL CALCULATED.3IONS-SCNC: 5 MMOL/L (ref 3–14)
AST SERPL W P-5'-P-CCNC: 18 U/L (ref 0–45)
BASOPHILS # BLD AUTO: 0.1 10E3/UL (ref 0–0.2)
BASOPHILS NFR BLD AUTO: 1 %
BILIRUB SERPL-MCNC: 0.4 MG/DL (ref 0.2–1.3)
BUN SERPL-MCNC: 14 MG/DL (ref 7–30)
CALCIUM SERPL-MCNC: 9.2 MG/DL (ref 8.5–10.1)
CHLORIDE BLD-SCNC: 108 MMOL/L (ref 94–109)
CHOLEST SERPL-MCNC: 191 MG/DL
CO2 SERPL-SCNC: 26 MMOL/L (ref 20–32)
CREAT SERPL-MCNC: 0.84 MG/DL (ref 0.52–1.04)
EOSINOPHIL # BLD AUTO: 0.2 10E3/UL (ref 0–0.7)
EOSINOPHIL NFR BLD AUTO: 4 %
ERYTHROCYTE [DISTWIDTH] IN BLOOD BY AUTOMATED COUNT: 14.1 % (ref 10–15)
FASTING STATUS PATIENT QL REPORTED: YES
GFR SERPL CREATININE-BSD FRML MDRD: 73 ML/MIN/1.73M2
GLUCOSE BLD-MCNC: 100 MG/DL (ref 70–99)
HCT VFR BLD AUTO: 40.6 % (ref 35–47)
HDLC SERPL-MCNC: 65 MG/DL
HGB BLD-MCNC: 13.4 G/DL (ref 11.7–15.7)
LDLC SERPL CALC-MCNC: 116 MG/DL
LYMPHOCYTES # BLD AUTO: 1.8 10E3/UL (ref 0.8–5.3)
LYMPHOCYTES NFR BLD AUTO: 27 %
MCH RBC QN AUTO: 31 PG (ref 26.5–33)
MCHC RBC AUTO-ENTMCNC: 33 G/DL (ref 31.5–36.5)
MCV RBC AUTO: 94 FL (ref 78–100)
MONOCYTES # BLD AUTO: 0.5 10E3/UL (ref 0–1.3)
MONOCYTES NFR BLD AUTO: 8 %
NEUTROPHILS # BLD AUTO: 4 10E3/UL (ref 1.6–8.3)
NEUTROPHILS NFR BLD AUTO: 61 %
NONHDLC SERPL-MCNC: 126 MG/DL
PLATELET # BLD AUTO: 242 10E3/UL (ref 150–450)
POTASSIUM BLD-SCNC: 4.2 MMOL/L (ref 3.4–5.3)
PROT SERPL-MCNC: 6.6 G/DL (ref 6.8–8.8)
RBC # BLD AUTO: 4.32 10E6/UL (ref 3.8–5.2)
SODIUM SERPL-SCNC: 139 MMOL/L (ref 133–144)
T4 FREE SERPL-MCNC: 1.01 NG/DL (ref 0.76–1.46)
TRIGL SERPL-MCNC: 48 MG/DL
TSH SERPL DL<=0.005 MIU/L-ACNC: 4.23 MU/L (ref 0.4–4)
WBC # BLD AUTO: 6.6 10E3/UL (ref 4–11)

## 2021-08-27 PROCEDURE — G0009 ADMIN PNEUMOCOCCAL VACCINE: HCPCS | Performed by: NURSE PRACTITIONER

## 2021-08-27 PROCEDURE — 84443 ASSAY THYROID STIM HORMONE: CPT | Performed by: NURSE PRACTITIONER

## 2021-08-27 PROCEDURE — 36415 COLL VENOUS BLD VENIPUNCTURE: CPT | Performed by: NURSE PRACTITIONER

## 2021-08-27 PROCEDURE — 80053 COMPREHEN METABOLIC PANEL: CPT | Performed by: NURSE PRACTITIONER

## 2021-08-27 PROCEDURE — G0402 INITIAL PREVENTIVE EXAM: HCPCS | Performed by: NURSE PRACTITIONER

## 2021-08-27 PROCEDURE — 90732 PPSV23 VACC 2 YRS+ SUBQ/IM: CPT | Performed by: NURSE PRACTITIONER

## 2021-08-27 PROCEDURE — 84439 ASSAY OF FREE THYROXINE: CPT | Performed by: NURSE PRACTITIONER

## 2021-08-27 PROCEDURE — 85025 COMPLETE CBC W/AUTO DIFF WBC: CPT | Performed by: NURSE PRACTITIONER

## 2021-08-27 PROCEDURE — 80061 LIPID PANEL: CPT | Performed by: NURSE PRACTITIONER

## 2021-08-27 RX ORDER — MUPIROCIN CALCIUM 20 MG/G
CREAM TOPICAL 2 TIMES DAILY
Qty: 15 G | Refills: 0 | Status: SHIPPED | OUTPATIENT
Start: 2021-08-27

## 2021-08-27 RX ORDER — DESONIDE 0.5 MG/G
CREAM TOPICAL 2 TIMES DAILY
Qty: 15 G | Refills: 0 | Status: SHIPPED | OUTPATIENT
Start: 2021-08-27

## 2021-08-27 ASSESSMENT — ENCOUNTER SYMPTOMS
PALPITATIONS: 0
COUGH: 0
ARTHRALGIAS: 1
JOINT SWELLING: 0
DIARRHEA: 0
EYE PAIN: 0
FREQUENCY: 0
FEVER: 0
DIZZINESS: 0
WEAKNESS: 1
DYSURIA: 0
HEMATURIA: 0
SHORTNESS OF BREATH: 0
SORE THROAT: 0
MYALGIAS: 1
HEMATOCHEZIA: 0
HEARTBURN: 0
ABDOMINAL PAIN: 0
CONSTIPATION: 0
NAUSEA: 0
PARESTHESIAS: 0
HEADACHES: 0
NERVOUS/ANXIOUS: 0
BREAST MASS: 0
CHILLS: 0

## 2021-08-27 ASSESSMENT — ACTIVITIES OF DAILY LIVING (ADL): CURRENT_FUNCTION: NO ASSISTANCE NEEDED

## 2021-08-27 ASSESSMENT — MIFFLIN-ST. JEOR: SCORE: 1586.88

## 2021-08-27 NOTE — PROGRESS NOTES
"SUBJECTIVE:   Airam Medrano is a 66 year old female who presents for Preventive Visit.      Patient has been advised of split billing requirements and indicates understanding: Yes   Are you in the first 12 months of your Medicare coverage?  Yes,  Visual Acuity:  Right Eye: 20/25   Left Eye: 20/25  Both Eyes: 20/25    Healthy Habits:     In general, how would you rate your overall health?  Good    Frequency of exercise:  4-5 days/week    Duration of exercise:  30-45 minutes    Do you usually eat at least 4 servings of fruit and vegetables a day, include whole grains    & fiber and avoid regularly eating high fat or \"junk\" foods?  Yes    Taking medications regularly:  Not Applicable    Medication side effects:  Not applicable    Ability to successfully perform activities of daily living:  No assistance needed    Home Safety:  No safety concerns identified    Hearing Impairment:  No hearing concerns    In the past 6 months, have you been bothered by leaking of urine? Yes    In general, how would you rate your overall mental or emotional health?  Good      PHQ-2 Total Score: 0    Additional concerns today:  Yes    Do you feel safe in your environment? Yes    Have you ever done Advance Care Planning? (For example, a Health Directive, POLST, or a discussion with a medical provider or your loved ones about your wishes): Yes, advance care planning is on file.       Fall risk  Fallen 2 or more times in the past year?: No  Any fall with injury in the past year?: No    Cognitive Screening   1) Repeat 3 items (Leader, Season, Table)    2) Clock draw: NORMAL  3) 3 item recall: Recalls 1 object   Results: NORMAL clock, 1-2 items recalled: COGNITIVE IMPAIRMENT LESS LIKELY    Mini-CogTM Copyright [employer]. Licensed by the author for use in [facility]; reprinted with permission (pilar@.[email or web address]). All rights reserved.      Do you have sleep apnea, excessive snoring or daytime drowsiness?: no  Colon cancer screening:  Last " colonoscopy 7/24/2018, due every 3 years due to polyp  Cervical cancer screening:  Last pap 7/2020, no further screening due to age.  Breast cancer screening:  Last mammogram 8/2021    Reviewed and updated as needed this visit by clinical staff                 Reviewed and updated as needed this visit by Provider                Social History     Tobacco Use     Smoking status: Never Smoker     Smokeless tobacco: Never Used   Substance Use Topics     Alcohol use: Yes     Comment: 1-2 drinks per week     If you drink alcohol do you typically have >3 drinks per day or >7 drinks per week? No    Alcohol Use 8/24/2021   Prescreen: >3 drinks/day or >7 drinks/week? No   Prescreen: >3 drinks/day or >7 drinks/week? -       Current providers sharing in care for this patient include:   Patient Care Team:  Haase, Susan Rachele, APRN CNP as PCP - General (Nurse Practitioner)  Haase, Susan Rachele, APRN CNP as Assigned PCP  Karlos Castellanos MD as MD (Gastroenterology)    The following health maintenance items are reviewed in Epic and correct as of today:  Health Maintenance Due   Topic Date Due     DEXA  Never done     HIV SCREENING  Never done     ADVANCE CARE PLANNING  01/13/2021     DTAP/TDAP/TD IMMUNIZATION (2 - Td or Tdap) 02/17/2021     ANNUAL REVIEW OF HM ORDERS  07/07/2021     FALL RISK ASSESSMENT  Never done     MEDICARE ANNUAL WELLNESS VISIT  08/01/2021     Pneumococcal Vaccine: 65+ Years (1 of 1 - PPSV23) 08/01/2021     INFLUENZA VACCINE (1) 09/01/2021     Lab work is in process  BP Readings from Last 3 Encounters:   08/27/21 122/78   07/07/20 133/83   05/23/18 112/70    Wt Readings from Last 3 Encounters:   08/27/21 102.5 kg (226 lb)   07/07/20 102.2 kg (225 lb 3.2 oz)   05/23/18 86.2 kg (190 lb)                  Patient Active Problem List   Diagnosis     Congenital deficiency of other clotting factors     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)     CARDIOVASCULAR SCREENING; LDL GOAL LESS THAN 160      Advance Care Planning     Right-sided low back pain with right-sided sciatica, unspecified chronicity     Right hip pain     Chronic bilateral low back pain with right-sided sciatica     Factor V Leiden (H)     Primary osteoarthritis of right knee     Venous (peripheral) insufficiency     Adenomatous polyp of colon, unspecified part of colon     Past Surgical History:   Procedure Laterality Date     ORTHOPEDIC SURGERY      meniscus repair of left knee     VASCULAR SURGERY      veinectomy x2     ZZC NONSPECIFIC PROCEDURE  1989-90    varicose vein stripping       Social History     Tobacco Use     Smoking status: Never Smoker     Smokeless tobacco: Never Used   Substance Use Topics     Alcohol use: Yes     Comment: 1-2 drinks per week     Family History   Problem Relation Age of Onset     Cerebrovascular Disease Mother      Arthritis Father      Genitourinary Problems Maternal Grandmother      Heart Disease Maternal Grandfather      Cancer Paternal Grandmother      Cancer Paternal Grandfather          Current Outpatient Medications   Medication Sig Dispense Refill     desonide (DESOWEN) 0.05 % external cream Apply topically 2 times daily 15 g 0     mupirocin (BACTROBAN) 2 % external cream Apply topically 2 times daily 15 g 0     aspirin (ASA) 81 MG chewable tablet        cholecalciferol ( ULTRA STRENGTH) 50 MCG (2000 UT) CAPS Take 2,000 Units by mouth       FLUBLOK QUADRIVALENT 0.5 ML injection        ibuprofen (MOTRIN) 600 MG tablet Take 600 mg by mouth every 6 hours as needed. 0734-7441 mg daily       vitamin (B COMPLEX-C) tablet Take 1 tablet by mouth           Breast CA Risk Assessment (FHS-7) 8/24/2021   Do you have a family history of breast, colon, or ovarian cancer? No / Unknown         Mammogram Screening: Recommended mammography every 1-2 years with patient discussion and risk factor consideration  Pertinent mammograms are reviewed under the imaging tab.    Review of Systems   Constitutional:  "Negative for chills and fever.   HENT: Negative for congestion, ear pain, hearing loss and sore throat.    Eyes: Negative for pain and visual disturbance.   Respiratory: Negative for cough and shortness of breath.    Cardiovascular: Negative for chest pain, palpitations and peripheral edema.   Gastrointestinal: Negative for abdominal pain, constipation, diarrhea, heartburn, hematochezia and nausea.   Breasts:  Negative for tenderness, breast mass and discharge.   Genitourinary: Negative for dysuria, frequency, genital sores, hematuria, pelvic pain, urgency, vaginal bleeding and vaginal discharge.   Musculoskeletal: Positive for arthralgias and myalgias. Negative for joint swelling.   Skin: Negative for rash.   Neurological: Positive for weakness. Negative for dizziness, headaches and paresthesias.   Psychiatric/Behavioral: Negative for mood changes. The patient is not nervous/anxious.        OBJECTIVE:   /78 (BP Location: Right arm, Patient Position: Chair, Cuff Size: Adult Large)   Pulse 60   Temp 98.2  F (36.8  C) (Oral)   Resp 12   Ht 1.676 m (5' 6\")   Wt 102.5 kg (226 lb)   SpO2 95%   BMI 36.48 kg/m   Estimated body mass index is 36.35 kg/m  as calculated from the following:    Height as of 7/7/20: 1.676 m (5' 6\").    Weight as of 7/7/20: 102.2 kg (225 lb 3.2 oz).  Physical Exam  GENERAL APPEARANCE: healthy, alert and no distress  EYES: Eyes grossly normal to inspection, PERRL and conjunctivae and sclerae normal  HENT: ear canals and TM's normal, nose and mouth without ulcers or lesions, oropharynx clear and oral mucous membranes moist  NECK: no adenopathy, no asymmetry, masses, or scars and thyroid normal to palpation  RESP: lungs clear to auscultation - no rales, rhonchi or wheezes  BREAST: normal without masses, tenderness or nipple discharge and no palpable axillary masses or adenopathy  CV: regular rate and rhythm, normal S1 S2, no S3 or S4, no murmur, click or rub, no peripheral edema and " "peripheral pulses strong  ABDOMEN: soft, nontender, no hepatosplenomegaly, no masses and bowel sounds normal  MS: no musculoskeletal defects are noted and gait is age appropriate without ataxia  SKIN: rash below the right eye  NEURO: Normal strength and tone, sensory exam grossly normal, mentation intact and speech normal  PSYCH: mentation appears normal and affect normal/bright      ASSESSMENT / PLAN:   Airam was seen today for wellness visit.    Diagnoses and all orders for this visit:    Welcome to Medicare preventive visit  -     REVIEW OF HEALTH MAINTENANCE PROTOCOL ORDERS  -     CBC with Platelets & Differential; Future  -     Comprehensive metabolic panel; Future  -     Lipid panel reflex to direct LDL Fasting; Future  -     TSH with free T4 reflex; Future  -     CBC with Platelets & Differential  -     Comprehensive metabolic panel  -     Lipid panel reflex to direct LDL Fasting  -     TSH with free T4 reflex    Screening for HIV (human immunodeficiency virus)    Menopause present  -     DEXA HIP/PELVIS/SPINE - Future; Future    Morbid obesity (H): discussed diet and exercise    Rash: around outline of mask  -     desonide (DESOWEN) 0.05 % external cream; Apply topically 2 times daily  -     mupirocin (BACTROBAN) 2 % external cream; Apply topically 2 times daily    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    Weight as of 7/7/20: 102.2 kg (225 lb 3.2 oz).    Weight management plan: Discussed healthy diet and exercise " guidelines    She reports that she has never smoked. She has never used smokeless tobacco.      Appropriate preventive services were discussed with this patient, including applicable screening as appropriate for cardiovascular disease, diabetes, osteopenia/osteoporosis, and glaucoma.  As appropriate for age/gender, discussed screening for colorectal cancer, prostate cancer, breast cancer, and cervical cancer. Checklist reviewing preventive services available has been given to the patient.    Reviewed patients plan of care and provided an AVS. The Basic Care Plan (routine screening as documented in Health Maintenance) for Airam meets the Care Plan requirement. This Care Plan has been established and reviewed with the Patient.    Counseling Resources:  ATP IV Guidelines  Pooled Cohorts Equation Calculator  Breast Cancer Risk Calculator  Breast Cancer: Medication to Reduce Risk  FRAX Risk Assessment  ICSI Preventive Guidelines  Dietary Guidelines for Americans, 2010  USDA's MyPlate  ASA Prophylaxis  Lung CA Screening  Follow up in 1 year, sooner as needed.   Susan Haase, APRN [facility]    Identified Health Risks:    Information on urinary incontinence and treatment options given to patient.

## 2021-09-01 ENCOUNTER — ANCILLARY PROCEDURE (OUTPATIENT)
Dept: BONE DENSITY | Facility: CLINIC | Age: 66
End: 2021-09-01
Attending: NURSE PRACTITIONER
Payer: COMMERCIAL

## 2021-09-01 DIAGNOSIS — Z78.0 MENOPAUSE PRESENT: ICD-10-CM

## 2021-09-01 PROCEDURE — 77080 DXA BONE DENSITY AXIAL: CPT | Performed by: FAMILY MEDICINE

## 2021-10-12 ENCOUNTER — MYC MEDICAL ADVICE (OUTPATIENT)
Dept: FAMILY MEDICINE | Facility: CLINIC | Age: 66
End: 2021-10-12

## 2021-10-15 NOTE — TELEPHONE ENCOUNTER
Summary:    Patient is due/failing the following:   Flu vaccine    Reviewed:  [] CARE EVERYWHERE  [] LAST OV NOTE INCLUDING ENDO  [] FYI TAB  [] MYCHART ACTIVE?  [] LAST PANEL ENCOUNTER  [] FUTURE APPTS  [] IMMUNIZATIONS          Action needed:   Patient needs nurse only appointment.    Type of outreach:    Sent [facility] message.                                                                               Amy Nixon/MATIAS  [place]---[facility]

## 2021-10-22 ENCOUNTER — MYC MEDICAL ADVICE (OUTPATIENT)
Dept: FAMILY MEDICINE | Facility: CLINIC | Age: 66
End: 2021-10-22

## 2021-10-24 ENCOUNTER — HEALTH MAINTENANCE LETTER (OUTPATIENT)
Age: 66
End: 2021-10-24

## 2021-10-25 RX ORDER — ASCORBIC ACID 125 MG
TABLET,CHEWABLE ORAL
Qty: 30 CAPSULE | Refills: 0 | COMMUNITY
Start: 2021-10-25

## 2021-10-25 RX ORDER — METAPROTERENOL SULFATE 10 MG
500 TABLET ORAL DAILY
Qty: 30 CAPSULE | Refills: 0 | COMMUNITY
Start: 2021-10-25

## 2021-10-25 RX ORDER — CHOLECALCIFEROL (VITAMIN D3) 50 MCG
1 TABLET ORAL DAILY
Qty: 30 TABLET | Refills: 0 | COMMUNITY
Start: 2021-10-25

## 2021-10-25 RX ORDER — AMOXICILLIN 500 MG
CAPSULE ORAL
Qty: 30 CAPSULE | Refills: 0 | COMMUNITY
Start: 2021-10-25

## 2022-05-02 ENCOUNTER — MYC MEDICAL ADVICE (OUTPATIENT)
Dept: FAMILY MEDICINE | Facility: CLINIC | Age: 67
End: 2022-05-02
Payer: COMMERCIAL

## 2022-05-02 NOTE — TELEPHONE ENCOUNTER
Routed to  covering team, see [id number] message and advise f/u plan, E visit needed?  Doris Cuevas, RN, BSN  [facility]

## 2022-05-05 ENCOUNTER — VIRTUAL VISIT (OUTPATIENT)
Dept: FAMILY MEDICINE | Facility: CLINIC | Age: 67
End: 2022-05-05
Payer: COMMERCIAL

## 2022-05-05 DIAGNOSIS — D68.51 FACTOR V LEIDEN (H): Primary | ICD-10-CM

## 2022-05-05 DIAGNOSIS — E66.01 MORBID OBESITY (H): ICD-10-CM

## 2022-05-05 DIAGNOSIS — Z71.85 VACCINE COUNSELING: ICD-10-CM

## 2022-05-05 DIAGNOSIS — I87.2 VENOUS (PERIPHERAL) INSUFFICIENCY: ICD-10-CM

## 2022-05-05 PROCEDURE — 99213 OFFICE O/P EST LOW 20 MIN: CPT | Mod: 95 | Performed by: PHYSICIAN ASSISTANT

## 2022-05-05 NOTE — PROGRESS NOTES
"Airam is a 66 year old who is being evaluated via a billable video visit.      How would you like to obtain your AVS? MyChart  If the video visit is dropped, the invitation should be resent by: Text to cell phone: [phone]  Will anyone else be joining your video visit? No    Video Start Time: 7:40 AM    Assessment & Plan     Factor V Leiden (H)  Order faxed to [facility]  - Compression Sleeve/Stocking Order for DME - ONLY FOR DME    Venous (peripheral) insufficiency  Order faxed to [facility].   - Compression Sleeve/Stocking Order for DME - ONLY FOR DME    Morbid obesity (H)  Diet and exercise    Vaccine counseling  Discussed options and potential reactions.  Recommend Pfizer or Moderna vaccine.     BMI:   Estimated body mass index is 36.48 kg/m  as calculated from the following:    Height as of 8/27/21: 1.676 m (5' 6\").    Weight as of 8/27/21: 102.5 kg (226 lb).           No follow-ups on file.    Shantell Everett PA-C  [facility]    Subjective   Airam is a 66 year old who presents for the following health issues     HPI     Concern - Discuss COVID 19 booster vaccine and possible reactions     Patient received J&J vaccines. Had chills and body aches about 8-12 hours after with elevated temp.     Pt requesting new Rx for compression stockings due to PVI.   Review of Systems   Constitutional, HEENT, cardiovascular, pulmonary, gi and gu systems are negative, except as otherwise noted.      Objective           Vitals:  No vitals were obtained today due to virtual visit.    Physical Exam   GENERAL: Healthy, alert and no distress  EYES: Eyes grossly normal to inspection.  No discharge or erythema, or obvious scleral/conjunctival abnormalities.  RESP: No audible wheeze, cough, or visible cyanosis.  No visible retractions or increased work of breathing.    SKIN: Visible skin clear. No significant rash, abnormal pigmentation or lesions.  NEURO: Cranial nerves grossly intact.  " Mentation and speech appropriate for age.  PSYCH: Mentation appears normal, affect normal/bright, judgement and insight intact, normal speech and appearance well-groomed.                Video-Visit Details    Type of service:  Video Visit    Video End Time:8:01 AM    Originating Location (pt. Location): Home    D[facility] ([facility]     Platform used for Video Visit: [employer]

## 2022-10-15 ENCOUNTER — HEALTH MAINTENANCE LETTER (OUTPATIENT)
Age: 67
End: 2022-10-15

## 2024-10-13 ENCOUNTER — HEALTH MAINTENANCE LETTER (OUTPATIENT)
Age: 69
End: 2024-10-13

## 2025-03-05 NOTE — TELEPHONE ENCOUNTER
Kateryna Albert   1973, 51 y.o. female   [id number]       Referring Provider: Marcelino Sanches DO  Referral Type: Referring Provider Encounter Note    Reason for Visit: Evaluation of suspected change in hearing, tinnitus, or balance.    ADULT AUDIOLOGIC EVALUATION      Kateryna Albert is a 51 y.o. female seen today, 3/5/2025, for an initial audiologic evaluation.      AUDIOLOGIC AND OTHER PERTINENT MEDICAL HISTORY:        Kateryna Albert noted concern for dizziness, present for several months, more persistent, feels dizzy even when still, can be worsened with some movements or visual stimulus like in a grocery store; has had some intermittent pain in her ears, noted this does nto seem to be related to the dizziness.     She denied aural fullness, otorrhea, tinnitus, history of noise exposure, history of ear surgery, and family history of early onset hearing loss.    IMPRESSIONS:        Today's results revealed bilatearl sensorineural eharing loss with normal middle ear function and excellent speech understanding when in quiet at soft presentation level for both ears.  Reviewed findings with patient; follow medical recommendations of Marcelino Sanches DO.     ASSESSMENT AND FINDINGS:       Otoscopy revealed: Non-occlusive cerumen in both ears    RIGHT EAR:  Hearing Sensitivity: Within normal limits through 4000 Hz steeply sloping to moderate sensorineural hearing loss.  Speech Recognition Threshold: 10 dBHL  Word Recognition: Excellent (96%), based on NU-6 25-word list at 45 dBHL using recorded speech stimuli.    Tympanometry: Normal peak pressure and compliance, Type A tympanogram, consistent with normal middle ear function.       LEFT EAR:  Hearing Sensitivity: Within normal limits through 3000 Hz steeply sloping to moderate sensorineural hearing loss.  Speech Recognition Threshold: 10 dBHL  Word Recognition: Excellent (100%), based on NU-6 25-word list at 45 dBHL using recorded speech stimuli.    Tympanometry: Normal peak  Pt called stating had shingles in 2017, was wondering if she can get her second dose.     Quintin Madrigal on 10/27/2020 at 2:11 PM